# Patient Record
Sex: MALE | Race: WHITE | NOT HISPANIC OR LATINO | Employment: OTHER | URBAN - METROPOLITAN AREA
[De-identification: names, ages, dates, MRNs, and addresses within clinical notes are randomized per-mention and may not be internally consistent; named-entity substitution may affect disease eponyms.]

---

## 2017-10-09 LAB — HCV AB SER-ACNC: NEGATIVE

## 2020-01-21 ENCOUNTER — PATIENT OUTREACH (OUTPATIENT)
Dept: ADMINISTRATIVE | Facility: HOSPITAL | Age: 65
End: 2020-01-21

## 2020-02-21 ENCOUNTER — OFFICE VISIT (OUTPATIENT)
Dept: FAMILY MEDICINE | Facility: CLINIC | Age: 65
End: 2020-02-21
Payer: MEDICARE

## 2020-02-21 ENCOUNTER — HOSPITAL ENCOUNTER (OUTPATIENT)
Dept: RADIOLOGY | Facility: CLINIC | Age: 65
Discharge: HOME | End: 2020-02-21
Attending: NURSE PRACTITIONER
Payer: MEDICARE

## 2020-02-21 VITALS
BODY MASS INDEX: 32.14 KG/M2 | OXYGEN SATURATION: 94 % | WEIGHT: 212.06 LBS | HEIGHT: 68 IN | TEMPERATURE: 98 F | DIASTOLIC BLOOD PRESSURE: 68 MMHG | HEART RATE: 51 BPM | SYSTOLIC BLOOD PRESSURE: 160 MMHG

## 2020-02-21 DIAGNOSIS — R03.0 ELEVATED BLOOD PRESSURE READING IN OFFICE WITHOUT DIAGNOSIS OF HYPERTENSION: ICD-10-CM

## 2020-02-21 DIAGNOSIS — J44.9 CHRONIC OBSTRUCTIVE PULMONARY DISEASE, UNSPECIFIED COPD TYPE: ICD-10-CM

## 2020-02-21 DIAGNOSIS — E78.5 HYPERLIPIDEMIA, UNSPECIFIED HYPERLIPIDEMIA TYPE: ICD-10-CM

## 2020-02-21 DIAGNOSIS — Z12.5 SCREENING FOR MALIGNANT NEOPLASM OF PROSTATE: ICD-10-CM

## 2020-02-21 DIAGNOSIS — Z87.09 PERSONAL HISTORY OF ASBESTOSIS: ICD-10-CM

## 2020-02-21 DIAGNOSIS — R06.02 CHRONIC SHORTNESS OF BREATH: ICD-10-CM

## 2020-02-21 DIAGNOSIS — R79.81 BORDERLINE LOW OXYGEN SATURATION LEVEL: ICD-10-CM

## 2020-02-21 DIAGNOSIS — R06.2 DIFFUSE WHEEZING: ICD-10-CM

## 2020-02-21 DIAGNOSIS — Z99.81 ON HOME OXYGEN THERAPY: ICD-10-CM

## 2020-02-21 DIAGNOSIS — F17.210 HEAVY CIGARETTE SMOKER: ICD-10-CM

## 2020-02-21 DIAGNOSIS — Z87.09 PERSONAL HISTORY OF ASBESTOSIS: Primary | ICD-10-CM

## 2020-02-21 PROCEDURE — 99999 PR PBB SHADOW E&M-EST. PATIENT-LVL V: ICD-10-PCS | Mod: PBBFAC,,, | Performed by: NURSE PRACTITIONER

## 2020-02-21 PROCEDURE — 99204 OFFICE O/P NEW MOD 45 MIN: CPT | Mod: S$GLB,,, | Performed by: NURSE PRACTITIONER

## 2020-02-21 PROCEDURE — 71046 XR CHEST PA AND LATERAL: ICD-10-PCS | Mod: 26,,, | Performed by: RADIOLOGY

## 2020-02-21 PROCEDURE — 1101F PT FALLS ASSESS-DOCD LE1/YR: CPT | Mod: CPTII,S$GLB,, | Performed by: NURSE PRACTITIONER

## 2020-02-21 PROCEDURE — 71046 X-RAY EXAM CHEST 2 VIEWS: CPT | Mod: 26,,, | Performed by: RADIOLOGY

## 2020-02-21 PROCEDURE — 71046 X-RAY EXAM CHEST 2 VIEWS: CPT | Mod: TC,FY,PO

## 2020-02-21 PROCEDURE — 3008F BODY MASS INDEX DOCD: CPT | Mod: CPTII,S$GLB,, | Performed by: NURSE PRACTITIONER

## 2020-02-21 PROCEDURE — 1101F PR PT FALLS ASSESS DOC 0-1 FALLS W/OUT INJ PAST YR: ICD-10-PCS | Mod: CPTII,S$GLB,, | Performed by: NURSE PRACTITIONER

## 2020-02-21 PROCEDURE — 3008F PR BODY MASS INDEX (BMI) DOCUMENTED: ICD-10-PCS | Mod: CPTII,S$GLB,, | Performed by: NURSE PRACTITIONER

## 2020-02-21 PROCEDURE — 99204 PR OFFICE/OUTPT VISIT, NEW, LEVL IV, 45-59 MIN: ICD-10-PCS | Mod: S$GLB,,, | Performed by: NURSE PRACTITIONER

## 2020-02-21 PROCEDURE — 99999 PR PBB SHADOW E&M-EST. PATIENT-LVL V: CPT | Mod: PBBFAC,,, | Performed by: NURSE PRACTITIONER

## 2020-02-21 RX ORDER — ATORVASTATIN CALCIUM 20 MG/1
20 TABLET, FILM COATED ORAL DAILY
COMMUNITY
End: 2020-02-21 | Stop reason: SDUPTHER

## 2020-02-21 RX ORDER — FLUTICASONE FUROATE, UMECLIDINIUM BROMIDE AND VILANTEROL TRIFENATATE 100; 62.5; 25 UG/1; UG/1; UG/1
POWDER RESPIRATORY (INHALATION)
COMMUNITY
Start: 2020-01-24 | End: 2020-02-21 | Stop reason: SDUPTHER

## 2020-02-21 RX ORDER — TIZANIDINE 4 MG/1
TABLET ORAL
COMMUNITY
Start: 2019-12-27

## 2020-02-21 RX ORDER — ATORVASTATIN CALCIUM 20 MG/1
20 TABLET, FILM COATED ORAL DAILY
Qty: 90 TABLET | Refills: 1 | Status: SHIPPED | OUTPATIENT
Start: 2020-02-21 | End: 2020-05-20

## 2020-02-21 RX ORDER — SECUKINUMAB 150 MG/ML
INJECTION SUBCUTANEOUS
COMMUNITY
Start: 2020-02-11

## 2020-02-21 RX ORDER — GLYCOPYRROLATE 25 UG/ML
SOLUTION RESPIRATORY (INHALATION)
COMMUNITY
Start: 2020-02-18 | End: 2020-03-05

## 2020-02-21 RX ORDER — OXYCODONE AND ACETAMINOPHEN 10; 325 MG/1; MG/1
TABLET ORAL
COMMUNITY
Start: 2020-02-10

## 2020-02-21 RX ORDER — ALBUTEROL SULFATE 90 UG/1
AEROSOL, METERED RESPIRATORY (INHALATION)
COMMUNITY
Start: 2020-01-20 | End: 2020-02-21 | Stop reason: SDUPTHER

## 2020-02-21 RX ORDER — FLUTICASONE FUROATE, UMECLIDINIUM BROMIDE AND VILANTEROL TRIFENATATE 100; 62.5; 25 UG/1; UG/1; UG/1
1 POWDER RESPIRATORY (INHALATION) DAILY
Qty: 1 EACH | Refills: 3 | Status: SHIPPED | OUTPATIENT
Start: 2020-02-21 | End: 2020-06-29

## 2020-02-21 RX ORDER — TRAMADOL HYDROCHLORIDE 50 MG/1
TABLET ORAL
COMMUNITY
Start: 2020-01-22

## 2020-02-21 RX ORDER — IPRATROPIUM BROMIDE AND ALBUTEROL SULFATE 2.5; .5 MG/3ML; MG/3ML
3 SOLUTION RESPIRATORY (INHALATION) EVERY 6 HOURS PRN
COMMUNITY
End: 2020-03-05 | Stop reason: SDUPTHER

## 2020-02-21 RX ORDER — ALBUTEROL SULFATE 90 UG/1
2 AEROSOL, METERED RESPIRATORY (INHALATION) EVERY 4 HOURS PRN
Qty: 18 G | Refills: 3 | Status: SHIPPED | OUTPATIENT
Start: 2020-02-21 | End: 2020-02-24

## 2020-02-21 NOTE — PROGRESS NOTES
"Subjective:       Patient ID: Gerardo Solis is a 65 y.o. male.    Chief Complaint: Annual Exam  This is his first time being seen in the primary care clinic.   He is accompanied today by his daughter.  [facility] was seeing Dr. Cash as PCP and last saw him about on year ago and he is no longer taking [employer].  He is here to establish care.  Vitals:    02/21/20 1605   BP: (!) 160/68   Pulse:    Temp:      BP Readings from Last 3 Encounters:   02/21/20 (!) 160/68     Past Medical History:   Diagnosis Date    COPD (chronic obstructive pulmonary disease)     Hyperlipidemia      Review of Systems   Respiratory: Positive for cough and shortness of breath.        [facility] had a chest xray a few months ago ordered per  for asbestos case, states he had a "lung test with doctors at attorneys office today.  State many years go had CT (4-6 years ago)  [facility]  States he does have oxygen at home and nebulizer at home as well  He is currently under care of pain management  States he had labs two weeks ago 02/04/2020 per Dr. Weil at [facility]  He denies any history of HTN and states BLOOD PRESSURE moisés today due to testing at  office and the test he and daughter are describing sounds like possible PFT.  Objective:      Physical Exam   Constitutional: He is oriented to person, place, and time. He appears well-developed and well-nourished. He is active and cooperative.   HENT:   Head: Normocephalic and atraumatic.   Right Ear: Hearing, tympanic membrane, external ear and ear canal normal.   Left Ear: Hearing, tympanic membrane, external ear and ear canal normal.   Nose: Nose normal.   Mouth/Throat: Uvula is midline and oropharynx is clear and moist.   Eyes: Conjunctivae and lids are normal.   Neck: Trachea normal, normal range of motion, full passive range of motion without pain and phonation normal. Neck supple.   Cardiovascular: Normal rate and regular rhythm.   Apical at 62 " "per auscultation   Pulmonary/Chest: He has decreased breath sounds. He has wheezes.   Visibly short of breath and states "this is my normal" and daughter agrees. States he has home oxygen but is not wearing oxygen in visit.    Abdominal: Soft. There is no tenderness. There is no rigidity and no guarding.   Musculoskeletal: Normal range of motion.   Lymphadenopathy:        Head (right side): No submental, no submandibular, no tonsillar, no preauricular, no posterior auricular and no occipital adenopathy present.        Head (left side): No submental, no submandibular, no tonsillar, no preauricular, no posterior auricular and no occipital adenopathy present.     He has no cervical adenopathy.   Neurological: He is alert and oriented to person, place, and time.   Skin: Skin is warm, dry and intact.   Psychiatric: He has a normal mood and affect. His speech is normal and behavior is normal. Judgment and thought content normal. Cognition and memory are normal.   Nursing note and vitals reviewed.      Assessment & Plan:       Personal history of asbestosis  -     TRELEGY ELLIPTA 100-62.5-25 mcg DsDv; Inhale 1 puff into the lungs once daily.  Dispense: 1 each; Refill: 3  -     albuterol (PROVENTIL/VENTOLIN HFA) 90 mcg/actuation inhaler; Inhale 2 puffs into the lungs every 4 (four) hours as needed for Wheezing.  Dispense: 18 g; Refill: 3  -     Ambulatory referral/consult to Pulmonology; Future; Expected date: 02/28/2020  -     X-Ray Chest PA And Lateral; Future; Expected date: 02/21/2020    Chronic obstructive pulmonary disease, unspecified COPD type  -     TRELEGY ELLIPTA 100-62.5-25 mcg DsDv; Inhale 1 puff into the lungs once daily.  Dispense: 1 each; Refill: 3  -     albuterol (PROVENTIL/VENTOLIN HFA) 90 mcg/actuation inhaler; Inhale 2 puffs into the lungs every 4 (four) hours as needed for Wheezing.  Dispense: 18 g; Refill: 3  -     Ambulatory referral/consult to Pulmonology; Future; Expected date: 02/28/2020  -     " X-Ray Chest PA And Lateral; Future; Expected date: 02/21/2020    Heavy cigarette smoker  -     Ambulatory referral/consult to Smoking Cessation Program; Future; Expected date: 02/28/2020  -     X-Ray Chest PA And Lateral; Future; Expected date: 02/21/2020    Chronic shortness of breath  -     TRELEGY ELLIPTA 100-62.5-25 mcg DsDv; Inhale 1 puff into the lungs once daily.  Dispense: 1 each; Refill: 3  -     albuterol (PROVENTIL/VENTOLIN HFA) 90 mcg/actuation inhaler; Inhale 2 puffs into the lungs every 4 (four) hours as needed for Wheezing.  Dispense: 18 g; Refill: 3  -     Ambulatory referral/consult to Pulmonology; Future; Expected date: 02/28/2020  -     X-Ray Chest PA And Lateral; Future; Expected date: 02/21/2020  -     CBC auto differential; Future; Expected date: 02/21/2020  -     Comprehensive metabolic panel; Future; Expected date: 02/21/2020    Screening for malignant neoplasm of prostate  -     PSA, Screening; Future; Expected date: 02/21/2020    Hyperlipidemia, unspecified hyperlipidemia type  -     Lipid panel; Future; Expected date: 02/21/2020    Borderline low oxygen saturation level  -     CBC auto differential; Future; Expected date: 02/21/2020  -     Comprehensive metabolic panel; Future; Expected date: 02/21/2020    Elevated blood pressure reading in office without diagnosis of hypertension  -     CBC auto differential; Future; Expected date: 02/21/2020  -     Comprehensive metabolic panel; Future; Expected date: 02/21/2020    Diffuse wheezing    On home oxygen therapy    Encouraged to go home and remain on his oxygen as ordered and advised to take oxygen with him as she travels. I have also encouraged him to use his nebulizer treatment every 4-6  Hours.      Medication List with Changes/Refills   Current Medications    ALBUTEROL-IPRATROPIUM (DUO-NEB) 2.5 MG-0.5 MG/3 ML NEBULIZER SOLUTION    Take 3 mLs by nebulization every 6 (six) hours as needed. Rescue    COSENTYX PEN, 2 PENS, 150 MG/ML PNIJ         LONHALA MAGNAIR REFILL 25 MCG/ML NEBU    VVN BID    OXYCODONE-ACETAMINOPHEN (PERCOCET)  MG PER TABLET        TIZANIDINE (ZANAFLEX) 4 MG TABLET        TRAMADOL (ULTRAM) 50 MG TABLET       Changed and/or Refilled Medications    Modified Medication Previous Medication    ALBUTEROL (PROVENTIL/VENTOLIN HFA) 90 MCG/ACTUATION INHALER albuterol (PROVENTIL/VENTOLIN HFA) 90 mcg/actuation inhaler       Inhale 2 puffs into the lungs every 4 (four) hours as needed for Wheezing.        TRELEGY ELLIPTA 100-62.5-25 MCG DSDV TRELEGY ELLIPTA 100-62.5-25 mcg DsDv       Inhale 1 puff into the lungs once daily.         Follow up if symptoms worsen or fail to improve.

## 2020-02-21 NOTE — PATIENT INSTRUCTIONS
Please get all of your tests from your  and bring in to your next visit  How to Quit Smoking  Smoking is one of the hardest habits to break. About half of all people who have ever smoked have been able to quit. Most people who still smoke want to quit. Here are some of the best ways to stop smoking.    Keep trying  Most smokers make many attempts at quitting before they are successful. Its important not to give up.  Go cold turkey  Most former smokers quit cold turkey (all at once). Trying to cut back gradually doesn't seem to work as well, perhaps because it continues the smoking habit. Also, it is possible to inhale more while smoking fewer cigarettes. This results in the same amount of nicotine in your body.  Get support  Support programs can be a big help, especially for heavy smokers. These groups offer lectures, ways to change behavior, and peer support. Here are some ways to find a support program:  · Free national quitline: 800-QUIT-NOW (794-142-6250).  · Hospital quit-smoking programs.  · American Lung Association: (319.356.9807).  · American Cancer Society (181-393-0979).  Support at home is important too. Nonsmokers can offer praise and encouragement. If the smoker in your life finds it hard to quit, encourage them to keep trying.  Over-the-counter medicines  Nicotine replacement therapy may make quitting easier. Certain aids, such as the nicotine patch, gum, and lozenges, are available without a prescription. It is best to use these under a doctors care, though. The skin patch provides a steady supply of nicotine. Nicotine gum and lozenges give temporary bursts of low levels of nicotine. Both methods reduce the craving for cigarettes. Warning: If you have nausea, vomiting, dizziness, weakness, or a fast heartbeat, stop using these products and see your doctor.  Prescription medicines  After reviewing your smoking patterns and past attempts to quit, your doctor may offer a prescription  "medicine such as bupropion, varenicline, a nicotine inhaler, or nasal spray. Each has advantages and side effects. Your doctor can review these with you.  Health benefits of quitting  The benefits of quitting start right away and keep improving the longer you go without smoking. These benefits occur at any age.  So whether you are 17 or 70, quitting is a good decision. Some of the benefits include:  · 20 minutes: Blood pressure and pulse return to normal.  · 8 hours: Oxygen levels return to normal.  · 2 days: Ability to smell and taste begin to improve as damaged nerves regrow.  · 2 to 3 weeks: Circulation and lung function improve.  · 1 to 9 months: Coughing, congestion, and shortness of breath decrease; tiredness decreases.  · 1 year: Risk of heart attack decreases by half.  · 5 years: Risk of lung cancer decreases by half; risk of stroke becomes the same as a nonsmokers.  For more on how to quit smoking, try these online resources:   · Smokefree.gov  · "Clearing the Air" booklet from the National Cancer [employer]: smokefree.gov/sites/default/files/pdf/clearing-the-air-accessible.pdf  Date Last Reviewed: 3/1/2017  © 8998-8412 [employer]. [address] [place] [address], [place] [address]. All rights reserved. This information is not intended as a substitute for professional medical care. Always follow your healthcare professional's instructions.        "

## 2020-02-21 NOTE — PROGRESS NOTES
Subjective:       Patient ID: Gerardo Solis is a 65 y.o. male.    Chief Complaint: No chief complaint on file.    HPI  There were no vitals filed for this visit.  Review of Systems   Objective:      Physical Exam        Assessment & Plan:       There are no diagnoses linked to this encounter.      No follow-ups on file.

## 2020-02-24 RX ORDER — ALBUTEROL SULFATE 90 UG/1
AEROSOL, METERED RESPIRATORY (INHALATION)
Qty: 90 G | Refills: 3 | Status: SHIPPED | OUTPATIENT
Start: 2020-02-24 | End: 2021-05-13

## 2020-02-28 ENCOUNTER — PATIENT OUTREACH (OUTPATIENT)
Dept: ADMINISTRATIVE | Facility: HOSPITAL | Age: 65
End: 2020-02-28

## 2020-02-28 ENCOUNTER — CLINICAL SUPPORT (OUTPATIENT)
Dept: FAMILY MEDICINE | Facility: CLINIC | Age: 65
End: 2020-02-28
Payer: MEDICARE

## 2020-02-28 ENCOUNTER — TELEPHONE (OUTPATIENT)
Dept: FAMILY MEDICINE | Facility: CLINIC | Age: 65
End: 2020-02-28

## 2020-02-28 ENCOUNTER — LAB VISIT (OUTPATIENT)
Dept: LAB | Facility: HOSPITAL | Age: 65
End: 2020-02-28
Attending: NURSE PRACTITIONER
Payer: MEDICARE

## 2020-02-28 VITALS — SYSTOLIC BLOOD PRESSURE: 158 MMHG | HEART RATE: 66 BPM | DIASTOLIC BLOOD PRESSURE: 72 MMHG

## 2020-02-28 DIAGNOSIS — R06.02 CHRONIC SHORTNESS OF BREATH: ICD-10-CM

## 2020-02-28 DIAGNOSIS — E78.5 HYPERLIPIDEMIA, UNSPECIFIED HYPERLIPIDEMIA TYPE: ICD-10-CM

## 2020-02-28 DIAGNOSIS — Z12.5 SCREENING FOR MALIGNANT NEOPLASM OF PROSTATE: ICD-10-CM

## 2020-02-28 DIAGNOSIS — R03.0 ELEVATED BLOOD PRESSURE READING IN OFFICE WITHOUT DIAGNOSIS OF HYPERTENSION: ICD-10-CM

## 2020-02-28 DIAGNOSIS — R79.81 BORDERLINE LOW OXYGEN SATURATION LEVEL: ICD-10-CM

## 2020-02-28 DIAGNOSIS — I10 HYPERTENSION, UNSPECIFIED TYPE: Primary | ICD-10-CM

## 2020-02-28 LAB
ALBUMIN SERPL BCP-MCNC: 4.2 G/DL (ref 3.5–5.2)
ALP SERPL-CCNC: 85 U/L (ref 55–135)
ALT SERPL W/O P-5'-P-CCNC: 59 U/L (ref 10–44)
ANION GAP SERPL CALC-SCNC: 9 MMOL/L (ref 8–16)
AST SERPL-CCNC: 37 U/L (ref 10–40)
BASOPHILS # BLD AUTO: 0.1 K/UL (ref 0–0.2)
BASOPHILS NFR BLD: 0.8 % (ref 0–1.9)
BILIRUB SERPL-MCNC: 0.8 MG/DL (ref 0.1–1)
BUN SERPL-MCNC: 18 MG/DL (ref 8–23)
CALCIUM SERPL-MCNC: 9.7 MG/DL (ref 8.7–10.5)
CHLORIDE SERPL-SCNC: 103 MMOL/L (ref 95–110)
CHOLEST SERPL-MCNC: 144 MG/DL (ref 120–199)
CHOLEST/HDLC SERPL: 4.2 {RATIO} (ref 2–5)
CO2 SERPL-SCNC: 26 MMOL/L (ref 23–29)
COMPLEXED PSA SERPL-MCNC: 4.9 NG/ML (ref 0–4)
CREAT SERPL-MCNC: 1.2 MG/DL (ref 0.5–1.4)
DIFFERENTIAL METHOD: ABNORMAL
EOSINOPHIL # BLD AUTO: 0.3 K/UL (ref 0–0.5)
EOSINOPHIL NFR BLD: 2.9 % (ref 0–8)
ERYTHROCYTE [DISTWIDTH] IN BLOOD BY AUTOMATED COUNT: 13.2 % (ref 11.5–14.5)
EST. GFR  (AFRICAN AMERICAN): >60 ML/MIN/1.73 M^2
EST. GFR  (NON AFRICAN AMERICAN): >60 ML/MIN/1.73 M^2
GLUCOSE SERPL-MCNC: 89 MG/DL (ref 70–110)
HCT VFR BLD AUTO: 52.7 % (ref 40–54)
HDLC SERPL-MCNC: 34 MG/DL (ref 40–75)
HDLC SERPL: 23.6 % (ref 20–50)
HGB BLD-MCNC: 16.2 G/DL (ref 14–18)
IMM GRANULOCYTES # BLD AUTO: 0.05 K/UL (ref 0–0.04)
IMM GRANULOCYTES NFR BLD AUTO: 0.4 % (ref 0–0.5)
LDLC SERPL CALC-MCNC: 75.6 MG/DL (ref 63–159)
LYMPHOCYTES # BLD AUTO: 2.1 K/UL (ref 1–4.8)
LYMPHOCYTES NFR BLD: 17.8 % (ref 18–48)
MCH RBC QN AUTO: 29.7 PG (ref 27–31)
MCHC RBC AUTO-ENTMCNC: 30.7 G/DL (ref 32–36)
MCV RBC AUTO: 97 FL (ref 82–98)
MONOCYTES # BLD AUTO: 0.9 K/UL (ref 0.3–1)
MONOCYTES NFR BLD: 7.1 % (ref 4–15)
NEUTROPHILS # BLD AUTO: 8.5 K/UL (ref 1.8–7.7)
NEUTROPHILS NFR BLD: 71 % (ref 38–73)
NONHDLC SERPL-MCNC: 110 MG/DL
NRBC BLD-RTO: 0 /100 WBC
PLATELET # BLD AUTO: 307 K/UL (ref 150–350)
PMV BLD AUTO: 9.9 FL (ref 9.2–12.9)
POTASSIUM SERPL-SCNC: 4.9 MMOL/L (ref 3.5–5.1)
PROT SERPL-MCNC: 8.5 G/DL (ref 6–8.4)
RBC # BLD AUTO: 5.46 M/UL (ref 4.6–6.2)
SODIUM SERPL-SCNC: 138 MMOL/L (ref 136–145)
TRIGL SERPL-MCNC: 172 MG/DL (ref 30–150)
WBC # BLD AUTO: 11.92 K/UL (ref 3.9–12.7)

## 2020-02-28 PROCEDURE — 85025 COMPLETE CBC W/AUTO DIFF WBC: CPT

## 2020-02-28 PROCEDURE — 80053 COMPREHEN METABOLIC PANEL: CPT

## 2020-02-28 PROCEDURE — 80061 LIPID PANEL: CPT

## 2020-02-28 PROCEDURE — 99499 UNLISTED E&M SERVICE: CPT | Mod: S$GLB,,, | Performed by: NURSE PRACTITIONER

## 2020-02-28 PROCEDURE — 36415 COLL VENOUS BLD VENIPUNCTURE: CPT | Mod: PO

## 2020-02-28 PROCEDURE — 99499 NO LOS: ICD-10-PCS | Mod: S$GLB,,, | Performed by: NURSE PRACTITIONER

## 2020-02-28 PROCEDURE — 99999 PR PBB SHADOW E&M-EST. PATIENT-LVL II: ICD-10-PCS | Mod: PBBFAC,,,

## 2020-02-28 PROCEDURE — 84153 ASSAY OF PSA TOTAL: CPT

## 2020-02-28 PROCEDURE — 99999 PR PBB SHADOW E&M-EST. PATIENT-LVL II: CPT | Mod: PBBFAC,,,

## 2020-02-28 NOTE — TELEPHONE ENCOUNTER
Patient came in for a BP check this morning.  He reports that he has taken all medications as directed.  He has not been doing and BP checks at home. Patient was very fidgety and unable to sit still. Reports that he did have 3 cups of black coffee and has been smoking since he got up this AM.  Initial BP reading was 182/82 P 70 and after sitting quietly for 5 minutes his reading was 158/72 .  Patient was educated about caffiene intake and dangers of smoking. Patient verbalized understanding.

## 2020-02-28 NOTE — TELEPHONE ENCOUNTER
Marycruz, thanks and  please contact and be sure he keeps appt to follow with Dr. Dukes on 03/13. I noticed he changed his appt date from 03/06/2020.

## 2020-02-28 NOTE — PROGRESS NOTES
Patient came in for a BP check this morning.  He reports that he has taken all medications as directed.  He has not been doing and BP checks at home. Patient was very fidgety and unable to sit still. Reports that he did have 3 cups of black coffee and has been smoking since he got up this AM.  Initial BP reading was 182/82 P 70 and after sitting quietly for 5 minutes his reading was 158/72 .  Patient was educated about caffiene intake and dangers of smoking. Patient verbalized understanding.  A message has been sent to Lizbeth Brown DNP  to advise of today's findings.

## 2020-02-28 NOTE — PROGRESS NOTES
Chart review completed 02/28/2020.  Care Everywhere updates requested and reviewed.  Immunizations reconciled. Media reviewed.     Letter mailed.

## 2020-02-28 NOTE — LETTER
February 28, 2020    Gerardo Solis  [address]  [place] [address]     [facility]  [address]  [place] [address]  Phone: [phone] Dear Henry, [facility] is committed to your overall health.  To help you get the most out of each of your visits, we will review your information to make sure you are up to date on all of your recommended tests and/or procedures.      As a new patient to Dr. Dukes, we may not have your complete medical records.  After reviewing your chart have found that you may be due for the following:    Health Maintenance Due   Topic    Hepatitis C Screening     Lipid Panel     TETANUS VACCINE     Colonoscopy     Pneumococcal Vaccine (65+ Low/Medium Risk) (1 of 2 - PCV13)    Abdominal Aortic Aneurysm Screening        If you have had any of the above done at another facility, please bring the records or information with you so that your record at [facility] will be complete.      If you are currently taking medication, please bring it with you to your appointment for review.    Thank you for letting us care for you,    Tyler Swan, Care Coordinator  [facility]  Phone: [phone]  Fax: [phone]

## 2020-02-28 NOTE — TELEPHONE ENCOUNTER
----- Message from Regla Andrea sent at 2/28/2020 12:26 PM CST -----  Contact: self  Type: Needs Medical Advice    Who Called:  self  Symptoms (please be specific):    How long has patient had these symptoms:    Pharmacy name and phone #:    [id number] - [place] - [address]  [facility] [place] [address]  Phone: [phone] Fax: [phone]  Best Call Back Number: [phone] (home)   Additional Information: Patient states the pharmacy said patient's prescription of TRELEGY ELLIPTA 100-62.5-25 mcg DsDv needs a PA. Please antonio patient to advise. Thanks!

## 2020-03-02 ENCOUNTER — DOCUMENTATION ONLY (OUTPATIENT)
Dept: FAMILY MEDICINE | Facility: CLINIC | Age: 65
End: 2020-03-02

## 2020-03-02 ENCOUNTER — NURSE TRIAGE (OUTPATIENT)
Dept: ADMINISTRATIVE | Facility: CLINIC | Age: 65
End: 2020-03-02

## 2020-03-02 NOTE — PROGRESS NOTES
PA:    Tone Pandya 100-62.5-25    CMM key:   [id number]  Case ID:    [id number]  [employer]  ----------------------------  3/3/2020  Determination:   Denied    Have sent in an appeal and have tried to to get this expedited ... Spoke to Lurdes Huffman Shirley and then Wilian who then told me that the appeal is in review and hopefully will know something by tomorrow.    I have spoken to Wendy (patient's) who has complained that there is no reason why [employer] should not cover the medication. I tried explaining about formularies nad medications but she just kept stating we don't know what we are doing.  ----------------------------------  3/4/2020  Determination:  Appeal APPROVED  Valid 3/4/2020-12/31/2020

## 2020-03-02 NOTE — TELEPHONE ENCOUNTER
Pt calling to state he needs a prior authorization for medication    Reason for Disposition   Caller has NON-URGENT medication question about med that PCP prescribed and triager unable to answer question    Protocols used: MEDICATION QUESTION CALL-A-AH

## 2020-03-03 ENCOUNTER — TELEPHONE (OUTPATIENT)
Dept: FAMILY MEDICINE | Facility: CLINIC | Age: 65
End: 2020-03-03

## 2020-03-03 ENCOUNTER — PATIENT MESSAGE (OUTPATIENT)
Dept: FAMILY MEDICINE | Facility: CLINIC | Age: 65
End: 2020-03-03

## 2020-03-03 NOTE — TELEPHONE ENCOUNTER
Daughter is asking questions about her dads medication that needs a prior auth I have transferred her to [facility]

## 2020-03-03 NOTE — TELEPHONE ENCOUNTER
----- Message from Jaci Leyva sent at 3/3/2020  1:46 PM CST -----  Contact: pt's daughter Wendy [phone]  Call placed to pod. Patient's daughter Wendy called back she is very upset she states she called twice yesterday for  A refill on  His Inhaler.

## 2020-03-05 ENCOUNTER — TELEPHONE (OUTPATIENT)
Dept: PULMONOLOGY | Facility: CLINIC | Age: 65
End: 2020-03-05

## 2020-03-05 ENCOUNTER — OFFICE VISIT (OUTPATIENT)
Dept: PULMONOLOGY | Facility: CLINIC | Age: 65
End: 2020-03-05
Payer: MEDICARE

## 2020-03-05 VITALS
DIASTOLIC BLOOD PRESSURE: 67 MMHG | HEART RATE: 42 BPM | SYSTOLIC BLOOD PRESSURE: 132 MMHG | OXYGEN SATURATION: 94 % | WEIGHT: 211.44 LBS | BODY MASS INDEX: 32.15 KG/M2

## 2020-03-05 DIAGNOSIS — R06.02 CHRONIC SHORTNESS OF BREATH: ICD-10-CM

## 2020-03-05 DIAGNOSIS — Z87.09 PERSONAL HISTORY OF ASBESTOSIS: ICD-10-CM

## 2020-03-05 DIAGNOSIS — J44.9 CHRONIC OBSTRUCTIVE PULMONARY DISEASE, UNSPECIFIED COPD TYPE: ICD-10-CM

## 2020-03-05 PROCEDURE — 3075F PR MOST RECENT SYSTOLIC BLOOD PRESS GE 130-139MM HG: ICD-10-PCS | Mod: CPTII,S$GLB,, | Performed by: NURSE PRACTITIONER

## 2020-03-05 PROCEDURE — 1101F PT FALLS ASSESS-DOCD LE1/YR: CPT | Mod: CPTII,S$GLB,, | Performed by: NURSE PRACTITIONER

## 2020-03-05 PROCEDURE — 99215 PR OFFICE/OUTPT VISIT, EST, LEVL V, 40-54 MIN: ICD-10-PCS | Mod: S$GLB,,, | Performed by: NURSE PRACTITIONER

## 2020-03-05 PROCEDURE — 3008F PR BODY MASS INDEX (BMI) DOCUMENTED: ICD-10-PCS | Mod: CPTII,S$GLB,, | Performed by: NURSE PRACTITIONER

## 2020-03-05 PROCEDURE — 3078F PR MOST RECENT DIASTOLIC BLOOD PRESSURE < 80 MM HG: ICD-10-PCS | Mod: CPTII,S$GLB,, | Performed by: NURSE PRACTITIONER

## 2020-03-05 PROCEDURE — 3075F SYST BP GE 130 - 139MM HG: CPT | Mod: CPTII,S$GLB,, | Performed by: NURSE PRACTITIONER

## 2020-03-05 PROCEDURE — 1101F PR PT FALLS ASSESS DOC 0-1 FALLS W/OUT INJ PAST YR: ICD-10-PCS | Mod: CPTII,S$GLB,, | Performed by: NURSE PRACTITIONER

## 2020-03-05 PROCEDURE — 99999 PR PBB SHADOW E&M-EST. PATIENT-LVL III: ICD-10-PCS | Mod: PBBFAC,,, | Performed by: NURSE PRACTITIONER

## 2020-03-05 PROCEDURE — 99215 OFFICE O/P EST HI 40 MIN: CPT | Mod: S$GLB,,, | Performed by: NURSE PRACTITIONER

## 2020-03-05 PROCEDURE — 3078F DIAST BP <80 MM HG: CPT | Mod: CPTII,S$GLB,, | Performed by: NURSE PRACTITIONER

## 2020-03-05 PROCEDURE — 99999 PR PBB SHADOW E&M-EST. PATIENT-LVL III: CPT | Mod: PBBFAC,,, | Performed by: NURSE PRACTITIONER

## 2020-03-05 PROCEDURE — 3008F BODY MASS INDEX DOCD: CPT | Mod: CPTII,S$GLB,, | Performed by: NURSE PRACTITIONER

## 2020-03-05 RX ORDER — PREDNISONE 20 MG/1
TABLET ORAL
Qty: 12 TABLET | Refills: 1 | Status: SHIPPED | OUTPATIENT
Start: 2020-03-05 | End: 2020-05-25 | Stop reason: SDUPTHER

## 2020-03-05 RX ORDER — IPRATROPIUM BROMIDE AND ALBUTEROL SULFATE 2.5; .5 MG/3ML; MG/3ML
3 SOLUTION RESPIRATORY (INHALATION) EVERY 6 HOURS PRN
Qty: 120 VIAL | Refills: 11 | Status: SHIPPED | OUTPATIENT
Start: 2020-03-05 | End: 2021-05-31 | Stop reason: SDUPTHER

## 2020-03-05 NOTE — PATIENT INSTRUCTIONS
Go to [facility] and any other imaging center you have been to and ask for disc of images  Please drop off at Pulmonary clinic to be added to [facility] system    Ordering CT of chest and pulmonary function test to evaluate extent of COPD.  Six minute walk is ordered, if you qualify I will order portable oxygen.    Continue Trelegy 1 puff daily every day,  rinse mouth after using due to risk for thrush if mouth or tongue has white sores contact clinic    Recommend stop smoking, pamphlet [facility]    Prednisone when needed if albuterol does not control shortness of breath  Take one pill a day for three days, repeat for shortness of breath

## 2020-03-05 NOTE — PROGRESS NOTES
3/5/2020    Gerardo Solis  New Patient Consult    Chief Complaint   Patient presents with    Cough     constant     Shortness of Breath     constant       HPI: 3/5/2020 dx COPD, currently treated by Dr. Cash PCP, will request documents from office. has to change due to insurance coverage. Had CT of chest and PFT with  office for asbestosis exposure.   Currently Trelegy daily, Albuterol rescue 4x daily, using nebulizer 4x daily,   SOB- onset 10 years, gradually worsening with time, worse with exertion, improves with rest and albuterol inhaler together, limits activity but still able to care for self in home. 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; Worked at [employer] Asbestosis exposure, Smoking Hx: currently smoking 1/2 pack daily, 49 pack years  Family Hx: no Lung Cancer, no COPD, no Asthma  Medical Hx: no previous pneumonia ; no previous shoulder/chest surgery          The chief compliant  problem is new to me  PFSH:  Past Medical History:   Diagnosis Date    COPD (chronic obstructive pulmonary disease)     Hyperlipidemia          Past Surgical History:   Procedure Laterality Date    TONSILLECTOMY       Social History     Tobacco Use    Smoking status: Current Every Day Smoker     Packs/day: 0.50     Years: 18.00     Pack years: 9.00     Types: Cigarettes    Smokeless tobacco: Never Used   Substance Use Topics    Alcohol use: Yes     Alcohol/week: 3.0 standard drinks     Types: 1 Glasses of wine, 2 Cans of beer per week     Comment: monthly    Drug use: Never     Family History   Problem Relation Age of Onset    Diabetes Mother     Lung disease Father      Review of patient's allergies indicates:  No Known Allergies  I have reviewed past medical,  family, and social history. I have reviewed previous nurse notes.    Performance Status:The patient's activity level is housebound activities.          Review of Systems   Constitutional: Negative for activity change, appetite change, chills, diaphoresis, fatigue, fever and unexpected weight change.   HENT: Negative for dental problem, postnasal drip, rhinorrhea, sinus pressure, sinus pain, sneezing, sore throat, trouble swallowing and voice change.    Respiratory: Negative for apnea, chest tightness, and stridor.  Positive for cough,  shortness of breath, wheezing  Cardiovascular: Negative for chest pain, palpitations and leg swelling.   Gastrointestinal: Negative for abdominal distention, abdominal pain, constipation and nausea.   Musculoskeletal: Negative for gait problem, myalgias and neck pain.   Skin: Negative for color change and pallor.   Allergic/Immunologic: Negative for environmental allergies and food allergies.   Neurological: Negative for dizziness, speech difficulty, weakness, light-headedness, numbness and headaches.   Hematological: Negative for adenopathy. Does not bruise/bleed easily.   Psychiatric/Behavioral: Negative for dysphoric mood and sleep disturbance. The patient is not nervous/anxious.           Exam:Comprehensive exam done. /67 (BP Location: Right arm, Patient Position: Sitting)   Pulse (!) 42   Wt 95.9 kg (211 lb 6.7 oz)   SpO2 (!) 94% Comment: on room air at rest  BMI 32.15 kg/m²   Exam included Vitals as listed  Constitutional: He is oriented to person, place, and time. He appears well-developed. No distress.   Nose: Nose normal.   Mouth/Throat: Uvula is midline, oropharynx is clear and moist and mucous membranes are normal.  No oropharyngeal exudate, posterior oropharyngeal edema, posterior oropharyngeal erythema or tonsillar abscesses.  Mallapatti (M) score 2  Eyes: Pupils are equal, round, and reactive to light.   Neck: No JVD present. No thyromegaly present.    Cardiovascular: Normal rate, regular rhythm and normal heart sounds. Exam reveals no gallop and no friction rub.   No murmur heard.  Pulmonary/Chest: Effort normal and breath sounds abnormal: Bilateral rales and wheeze.   No accessory muscle usage or stridor. No apnea and no tachypnea. No respiratory distress, decreased breath sounds, rhonchi, or tenderness.   Abdominal: Soft. He exhibits no mass. There is no tenderness. No hepatosplenomegaly, hernias and normoactive bowel sounds  Musculoskeletal: Normal range of motion. exhibits no edema.   Lymphadenopathy:     He has no cervical adenopathy.     He has no axillary adenopathy.   Neurological:  alert and oriented to person, place, and time. not disoriented.   Skin: Skin is warm and dry. Capillary refill takes less 2 sec. No cyanosis or erythema. No pallor. Nails show no clubbing.   Psychiatric: normal mood and affect. behavior is normal. Judgment and thought content normal.       Radiographs (ct chest and cxr) reviewed: view by direct vision   X-Ray Chest PA And Lateral 02/21/2020   COPD with mild chronic interstitial change.         Labs reviewed       Lab Results   Component Value Date    WBC 11.92 02/28/2020    RBC 5.46 02/28/2020    HGB 16.2 02/28/2020    HCT 52.7 02/28/2020    MCV 97 02/28/2020    MCH 29.7 02/28/2020    MCHC 30.7 (L) 02/28/2020    RDW 13.2 02/28/2020     02/28/2020    MPV 9.9 02/28/2020    GRAN 8.5 (H) 02/28/2020    GRAN 71.0 02/28/2020    LYMPH 2.1 02/28/2020    LYMPH 17.8 (L) 02/28/2020    MONO 0.9 02/28/2020    MONO 7.1 02/28/2020    EOS 0.3 02/28/2020    BASO 0.10 02/28/2020    EOSINOPHIL 2.9 02/28/2020    BASOPHIL 0.8 02/28/2020     Results for POLY TANNER (MRN [id number]) as of 3/5/2020 08:35   Ref. Range 2/28/2020 11:18   CO2 Latest Ref Range: 23 - 29 mmol/L 26     PFT will be done and results to be reviewed  Pulmonary Functions Testing Results:        Plan:  Clinical impression is apparently straight forward and impression  with management as below.    Gerardo was seen today for cough and shortness of breath.    Diagnoses and all orders for this visit:    Personal history of asbestosis  -     Ambulatory referral/consult to Pulmonology  -     CT Chest Without Contrast; Future    Chronic obstructive pulmonary disease, unspecified COPD type  -     Ambulatory referral/consult to Pulmonology  -     CT Chest Without Contrast; Future  -     Complete PFT with bronchodilator; Future  -     Six Minute Walk Test to qualify for Home Oxygen; Future  -     albuterol-ipratropium (DUO-NEB) 2.5 mg-0.5 mg/3 mL nebulizer solution; Take 3 mLs by nebulization every 6 (six) hours as needed for Wheezing or Shortness of Breath. Rescue  -     mucus clearing device (ACAPELLA, FLUTTER); 1 Device by Misc.(Non-Drug; Combo Route) route 2 (two) times daily.    Chronic shortness of breath  -     Ambulatory referral/consult to Pulmonology  -     CT Chest Without Contrast; Future        Follow up in about 2 months (around 5/5/2020), or if symptoms worsen or fail to improve.    Discussed with patient above for education the following:      Patient Instructions   Go to [facility] and any other imaging center you have been to and ask for disc of images  Please drop off at Pulmonary clinic to be added to [facility] system    Ordering CT of chest and pulmonary function test to evaluate extent of COPD.  Six minute walk is ordered, if you qualify I will order portable oxygen.    Continue Trelegy 1 puff daily every day,  rinse mouth after using due to risk for thrush if mouth or tongue has white sores contact clinic    Recommend stop smoking, pamphlet given [facility]    Prednisone when needed if albuterol does not control shortness of breath  Take one pill a day for three days, repeat for shortness of breath

## 2020-03-05 NOTE — LETTER
March 5, 2020      Lizbeth Brown DNP, APRN  [address] [facility] [address]  [facility] [address]           [facility]  [address]  [place] [address]  Phone: [phone]  Fax: [phone]          Patient: Gerardo Solis   MR Number: [id number]   YOB: 1955   Date of Visit: 3/5/2020       Dear Lizbeth Brown:    Thank you for referring Gerardo Solis to me for evaluation. Attached you will find relevant portions of my assessment and plan of care.    If you have questions, please do not hesitate to call me. I look forward to following Gerardo Solis along with you.    Sincerely,    Melinda Alamo, NP    Enclosure  CC:  No Recipients    If you would like to receive this communication electronically, please contact [email or web address] or [phone] to request more information on [employer] access.    For providers and/or their staff who would like to refer a patient to [facility], please contact us through our one-stop-shop provider referral line, [facility] , at [phone].    If you feel you have received this communication in error or would no longer like to receive these types of communications, please e-mail [email or web address]

## 2020-03-05 NOTE — TELEPHONE ENCOUNTER
Spoke with patient and he will come in and sign a records release form.     ----- Message from Melinda Alamo NP sent at 3/5/2020 10:00 AM CST -----  Request copy of last note, Ct report, and PFT report from Dr. Cash in [place].   Ask for report from [facility].

## 2020-03-06 ENCOUNTER — TELEPHONE (OUTPATIENT)
Dept: PULMONOLOGY | Facility: CLINIC | Age: 65
End: 2020-03-06

## 2020-03-09 PROBLEM — R97.20 ELEVATED PSA: Status: ACTIVE | Noted: 2020-03-09

## 2020-03-09 PROBLEM — E78.2 MIXED HYPERLIPIDEMIA: Status: ACTIVE | Noted: 2020-03-09

## 2020-03-09 PROBLEM — R74.8 ELEVATED LIVER ENZYMES: Status: ACTIVE | Noted: 2020-03-09

## 2020-03-09 NOTE — PROGRESS NOTES
Discussed results over the phone with patient in detail. He is currently taking his Lipitor and advised continued low fat diet and to also watch concentrated sugars. With COPD and SOB cannot do much vigorous exercise. I do not have any other PSA levels to compare his current level which is slightly high at 4.9 and he is not sure of any prior levels. I have encouraged him to be sure to keep appt with Dr. Dukes to establish this week and he will continue follow up as needed with his PSA. Pt. Verbalized understanding and that he would be sure to keep appointment.

## 2020-03-13 ENCOUNTER — OFFICE VISIT (OUTPATIENT)
Dept: FAMILY MEDICINE | Facility: CLINIC | Age: 65
End: 2020-03-13
Payer: MEDICARE

## 2020-03-13 VITALS
SYSTOLIC BLOOD PRESSURE: 160 MMHG | HEIGHT: 68 IN | OXYGEN SATURATION: 94 % | DIASTOLIC BLOOD PRESSURE: 82 MMHG | BODY MASS INDEX: 32.38 KG/M2 | HEART RATE: 56 BPM | WEIGHT: 213.63 LBS | TEMPERATURE: 98 F

## 2020-03-13 DIAGNOSIS — Z23 NEED FOR VACCINATION FOR PNEUMOCOCCUS: ICD-10-CM

## 2020-03-13 DIAGNOSIS — Z86.010 HISTORY OF COLON POLYPS: ICD-10-CM

## 2020-03-13 DIAGNOSIS — L40.9 PSORIASIS: ICD-10-CM

## 2020-03-13 DIAGNOSIS — Z72.0 TOBACCO ABUSE: ICD-10-CM

## 2020-03-13 DIAGNOSIS — E78.2 MIXED HYPERLIPIDEMIA: ICD-10-CM

## 2020-03-13 DIAGNOSIS — M15.9 PRIMARY OSTEOARTHRITIS INVOLVING MULTIPLE JOINTS: ICD-10-CM

## 2020-03-13 DIAGNOSIS — I10 ESSENTIAL HYPERTENSION: Primary | ICD-10-CM

## 2020-03-13 DIAGNOSIS — Z11.59 NEED FOR HEPATITIS C SCREENING TEST: ICD-10-CM

## 2020-03-13 DIAGNOSIS — Z87.09 PERSONAL HISTORY OF ASBESTOSIS: ICD-10-CM

## 2020-03-13 DIAGNOSIS — J44.9 CHRONIC OBSTRUCTIVE PULMONARY DISEASE, UNSPECIFIED COPD TYPE: ICD-10-CM

## 2020-03-13 DIAGNOSIS — R74.8 ELEVATED LIVER ENZYMES: ICD-10-CM

## 2020-03-13 DIAGNOSIS — R97.20 ELEVATED PSA: ICD-10-CM

## 2020-03-13 PROCEDURE — 90670 PCV13 VACCINE IM: CPT | Mod: S$GLB,,, | Performed by: FAMILY MEDICINE

## 2020-03-13 PROCEDURE — 99999 PR PBB SHADOW E&M-EST. PATIENT-LVL IV: CPT | Mod: PBBFAC,,, | Performed by: FAMILY MEDICINE

## 2020-03-13 PROCEDURE — 3077F PR MOST RECENT SYSTOLIC BLOOD PRESSURE >= 140 MM HG: ICD-10-PCS | Mod: CPTII,S$GLB,, | Performed by: FAMILY MEDICINE

## 2020-03-13 PROCEDURE — 99214 OFFICE O/P EST MOD 30 MIN: CPT | Mod: 25,S$GLB,, | Performed by: FAMILY MEDICINE

## 2020-03-13 PROCEDURE — 1101F PR PT FALLS ASSESS DOC 0-1 FALLS W/OUT INJ PAST YR: ICD-10-PCS | Mod: CPTII,S$GLB,, | Performed by: FAMILY MEDICINE

## 2020-03-13 PROCEDURE — 3077F SYST BP >= 140 MM HG: CPT | Mod: CPTII,S$GLB,, | Performed by: FAMILY MEDICINE

## 2020-03-13 PROCEDURE — 99214 PR OFFICE/OUTPT VISIT, EST, LEVL IV, 30-39 MIN: ICD-10-PCS | Mod: 25,S$GLB,, | Performed by: FAMILY MEDICINE

## 2020-03-13 PROCEDURE — 99999 PR PBB SHADOW E&M-EST. PATIENT-LVL IV: ICD-10-PCS | Mod: PBBFAC,,, | Performed by: FAMILY MEDICINE

## 2020-03-13 PROCEDURE — 3079F DIAST BP 80-89 MM HG: CPT | Mod: CPTII,S$GLB,, | Performed by: FAMILY MEDICINE

## 2020-03-13 PROCEDURE — 1101F PT FALLS ASSESS-DOCD LE1/YR: CPT | Mod: CPTII,S$GLB,, | Performed by: FAMILY MEDICINE

## 2020-03-13 PROCEDURE — G0009 ADMIN PNEUMOCOCCAL VACCINE: HCPCS | Mod: S$GLB,,, | Performed by: FAMILY MEDICINE

## 2020-03-13 PROCEDURE — 90670 PNEUMOCOCCAL CONJUGATE VACCINE 13-VALENT LESS THAN 5YO & GREATER THAN: ICD-10-PCS | Mod: S$GLB,,, | Performed by: FAMILY MEDICINE

## 2020-03-13 PROCEDURE — 3079F PR MOST RECENT DIASTOLIC BLOOD PRESSURE 80-89 MM HG: ICD-10-PCS | Mod: CPTII,S$GLB,, | Performed by: FAMILY MEDICINE

## 2020-03-13 PROCEDURE — 3008F PR BODY MASS INDEX (BMI) DOCUMENTED: ICD-10-PCS | Mod: CPTII,S$GLB,, | Performed by: FAMILY MEDICINE

## 2020-03-13 PROCEDURE — G0009 PNEUMOCOCCAL CONJUGATE VACCINE 13-VALENT LESS THAN 5YO & GREATER THAN: ICD-10-PCS | Mod: S$GLB,,, | Performed by: FAMILY MEDICINE

## 2020-03-13 PROCEDURE — 3008F BODY MASS INDEX DOCD: CPT | Mod: CPTII,S$GLB,, | Performed by: FAMILY MEDICINE

## 2020-03-13 NOTE — PROGRESS NOTES
Subjective:       Patient ID: Gerardo Solis is a 65 y.o. male.    Chief Complaint: No chief complaint on file.    HPI   Patient presents to establish care with a new family doctor after his family doctor (Dr. Cash) stopped taking his insurance.  He tells me he is good now but tells me he has significant fears about the corona virus and would like to discuss these today.  He has a past medical history as in the problem list and below and tells me his chronic osteoarthritis pains are well controlled on his current pain medications prescribed by his pain management doctor (Dr. Whaley) and his psoriasis symptoms are well controlled by his dermatologist (Dr. Ball).  He follows with pulmonology within our system and tells me that his breathing has been crappy for years and notes that pulmonology believes he may need to be on oxygen all the time.  He is only wearing this at night or as needed when at home currently.  Chest CT and pulmonary function testing has been ordered but not completed yet.  He tells me his breathing is actually better than his baseline currently his he is on steroids but notes even with this he still gets easily short of breath with walking short distances and wheezes and has a chronic slightly productive cough all the time.  He had labs completed after his initial visit to establish here with nurse practitioner Kevin on 02/21/2020 which showed normal lipid values other than slightly elevated triglycerides and slightly low HDL which he tells me is his baseline.  His Lipitor was increased previously but he had muscle aches with this.  This resolved with reducing his dosing any has been taking the medication most days, I forget every once in awhile.  His PSA was elevated at 4.9 and he is unsure but tells me thinks he had an elevated PSA previously but is unsure.  He denies any urinary complaints and tells me he discussed this with nurse practitioner Kevin and has been on the fence  about whether he wants to see Urology or simply recheck his PSA in 6 months.  He tells me he may not make an appointment with Urology for a few months but would like referral to be seen.  His CMP was essentially normal with only a slightly elevated ALT at 59 and his CBC showed no significant abnormalities.  Even with his severe respiratory symptoms he tells me he is still smoking and is not ready to quit at this time.  Questioning him about health maintenance issues he tells me he has had a colonoscopy completed through [facility] but notes he is overdue for repeat colonoscopy with polyps found and 5 year follow-up recommended 6-7 years ago.  He is interested in referral to Gastroenterology for repeat colonoscopy but tells me he cannot schedule this until after he completes testing with pulmonology.  He has never had HIV or STD screening previously to his knowledge but tells me he has no interest in any of this.  He initially advised me he had not had a tetanus vaccine in over 10 years, had not had pneumonia vaccination previously and had never had Shingrix vaccination and was interested in getting up-to-date with vaccines today if possible.  Later he advised me he forgot but is sure he is up-to-date with all vaccinations through his dermatologist noting that he received all necessary vaccinations prior to being started on Cosentyx as this weakens his immune system.  He was recommended to get a flu shot this year and did receive this even though he was advised this may not be effective on Cosentyx.  He has never had hepatitis C antibody screening to his knowledge.  He believes he has had abdominal aortic aneurysm screening but is unsure of where or when and asked if we would [facility]  Questioning him about his elevated blood pressures they he tells me he has had hypertension in the past but has not needed to medication chronically for this and tells me his  "blood pressures are elevated secondary to caffeine use and smoking before office visits.    Review of Systems   Constitutional: Negative for activity change, appetite change, fatigue and unexpected weight change.   HENT: Negative for congestion, hearing loss, sore throat, trouble swallowing and voice change.    Eyes: Negative for visual disturbance.   Respiratory: Positive for cough, chest tightness, shortness of breath and wheezing.    Cardiovascular: Negative for chest pain, palpitations and leg swelling.   Gastrointestinal: Negative for abdominal pain, blood in stool, constipation, diarrhea, nausea and vomiting.   Genitourinary: Negative for decreased urine volume, difficulty urinating, dysuria, enuresis, flank pain, frequency, genital sores, hematuria and urgency.   Musculoskeletal: Negative for arthralgias, back pain, gait problem, joint swelling, myalgias, neck pain and neck stiffness.   Skin: Negative for rash and wound.   Neurological: Negative for dizziness, tremors, syncope, weakness, light-headedness, numbness and headaches.   Hematological: Negative for adenopathy. Does not bruise/bleed easily.   Psychiatric/Behavioral: Negative for dysphoric mood and sleep disturbance. The patient is not nervous/anxious.          Objective:      Vitals:    03/13/20 0941 03/13/20 0946 03/13/20 1026   BP: (!) 162/82 (!) 158/82 (!) 160/82   Pulse: (!) 56     Temp: 98.4 °F (36.9 °C)     SpO2: (!) 94%     Weight: 96.9 kg (213 lb 10 oz)     Height: 5' 8" (1.727 m)     PainSc: 0-No pain       Physical Exam   Constitutional: He is oriented to person, place, and time. He appears well-developed and well-nourished.  Non-toxic appearance. He does not have a sickly appearance. He does not appear ill. No distress.   Examination performed with him sitting in the exam room chair per patient preference.   HENT:   Head: Normocephalic and atraumatic.   Right Ear: External ear normal.   Left Ear: External ear normal.   Nose: Nose normal. "   Mouth/Throat: Oropharynx is clear and moist.   Eyes: Pupils are equal, round, and reactive to light. Conjunctivae and EOM are normal. No scleral icterus.   Neck: Trachea normal, normal range of motion and phonation normal. Neck supple. No thyroid mass and no thyromegaly present.   Cardiovascular: Normal rate, regular rhythm and normal heart sounds. Exam reveals no gallop and no friction rub.   No murmur heard.  Pulmonary/Chest: Effort normal. No respiratory distress. He has decreased breath sounds. He has wheezes. He has no rhonchi. He has no rales. Chest wall is not dull to percussion. He exhibits no tenderness.   He appears short breath and has audible wheezing with entering the room.  He appears more comfortable at rest, has no audible wheezing and has no difficulty speaking in complete sentences.  Lung sounds are decreased throughout and there are expiratory wheezes present throughout.     Abdominal: Soft. Bowel sounds are normal. He exhibits no distension and no mass. There is no tenderness. There is no guarding.   Musculoskeletal: Normal range of motion. He exhibits no edema or deformity.   Lymphadenopathy:     He has no cervical adenopathy.   Neurological: He is alert and oriented to person, place, and time.   Skin: Skin is warm and dry. He is not diaphoretic.   Psychiatric: He has a normal mood and affect. His behavior is normal. Judgment and thought content normal.   Nursing note and vitals reviewed.        Assessment:       1. Essential hypertension    2. Mixed hyperlipidemia    3. Elevated liver enzymes    4. Chronic obstructive pulmonary disease, unspecified COPD type    5. Personal history of asbestosis    6. Elevated PSA    7. Tobacco abuse    8. Need for hepatitis C screening test    9. Psoriasis    10. Primary osteoarthritis involving multiple joints    11. History of colon polyps    12. Need for vaccination for pneumococcus          Plan:   Essential hypertension  -     Microalbumin/creatinine  urine ratio; Future    Mixed hyperlipidemia    Elevated liver enzymes    Chronic obstructive pulmonary disease, unspecified COPD type    Personal history of asbestosis    Elevated PSA  -     Ambulatory referral/consult to Urology; Future; Expected date: 03/20/2020    Tobacco abuse    Need for hepatitis C screening test  -     Hepatitis C Antibody; Future; Expected date: 03/13/2020    Psoriasis    Primary osteoarthritis involving multiple joints    History of colon polyps  -     Ambulatory referral/consult to Gastroenterology; Future; Expected date: 03/20/2020    Need for vaccination for pneumococcus  -     Pneumococcal Conjugate Vaccine (13 Valent) (IM)      Follow up in about 2 weeks (around 3/27/2020).    Gerardo appears to be stable and at his baseline although he appears to have severe COPD and likely does need oxygen around the clock.  He will complete chest CT and pulmonary function testing as directed by pulmonology for this but reports currently he is better than his baseline.  He will continue to follow with pulmonology but I advised him to alert me if he has any problems and cannot be seen by pulmonology.  His psoriasis is well controlled with Cosentyx but he is immunocompromised secondary to this and he will be have to be very careful to avoid upper respiratory and other infections.  He is currently wearing a mask at all times when outdoors and he is very careful about hand washing and cleaning surfaces.  He will continue to follow with Dermatology and we will request records for his vaccinations but he tells me he is up-to-date with all of these.  I reviewed his labs with him as above and with his elevated PSA he was interested in referral to Urology for their opinion on this.  He advised me if he does not keep an appointment with them he would alert me to repeat PSA testing at 6 months as he was directed.  This is a reasonable option and I am fine with this.  I did recommend checking hepatitis C antibody  and microalbumin to creatinine ratio and reviewing this with him he was in agreement this but did not want to repeat thigh ABS today.  He can complete these at his convenience and will call him with results once obtained.  I did recommend prostate examination today but he had no interest in this.  Be able to get this completed by Gastroenterology when completing a follow-up colonoscopy and advised him to discuss this with Gastroenterology.  I placed referral as requested.  His osteoarthritis pains are well controlled with his current pain medication and he will continue to follow with pain management for these.  His blood pressure was significantly elevated today and did not normalize upon recheck.  It is also been elevated at 2 of his 3 other visits within our system and I discussed possible treatment options for hypertension including hydrochlorothiazide, Cozaar and Norvasc with risks and benefits.  He was very resistant to start on a medication for hypertension and wanted to only check his blood pressures at home and keep a log of these daily.  He was in agreement to return with his automated blood pressure cuff and blood pressure log in 2 weeks for nursing blood pressure recheck and blood pressure log was provided to him today.  If his blood pressures are not well controlled at home we will start him on medication at that time.  I discussed the chronic health risks of tobacco abuse and possible smoking cessation options and supports with him but he had no interest in these and advised me he may just try to cut back in the future.  I discussed slow reduction method with him and highly recommended he start with this by reducing by 1 cigarette daily each week until smoke-free.  He stated he would think about this.  He does need any medication refills at this time and discussing follow-up with him he wanted to be seen back at 6 month interval.  I recommended sooner follow-up but he was not interested in this unless  he has any problems.    Gerardo was seen for a 30 min appointment today and greater than half this time was spent counseling on the above.

## 2020-05-07 ENCOUNTER — TELEPHONE (OUTPATIENT)
Dept: UROLOGY | Facility: CLINIC | Age: 65
End: 2020-05-07

## 2020-05-19 DIAGNOSIS — E78.5 HYPERLIPIDEMIA, UNSPECIFIED HYPERLIPIDEMIA TYPE: ICD-10-CM

## 2020-05-20 RX ORDER — ATORVASTATIN CALCIUM 20 MG/1
TABLET, FILM COATED ORAL
Qty: 90 TABLET | Refills: 1 | Status: SHIPPED | OUTPATIENT
Start: 2020-05-20

## 2020-05-21 DIAGNOSIS — I10 ESSENTIAL HYPERTENSION: ICD-10-CM

## 2020-05-21 DIAGNOSIS — Z11.59 NEED FOR HEPATITIS C SCREENING TEST: Primary | ICD-10-CM

## 2020-05-21 DIAGNOSIS — E78.2 MIXED HYPERLIPIDEMIA: ICD-10-CM

## 2020-05-21 NOTE — TELEPHONE ENCOUNTER
It appears he canceled his 2 week nursing blood pressure recheck and is not scheduled to return until his 6 month follow-up.  Have him schedule a blood pressure recheck and labs and I have placed orders for his medication refill.

## 2020-05-21 NOTE — TELEPHONE ENCOUNTER
Scheduled pt for BP check on 5/29 at 10:30 AM. Pt verbalized he is still sceptical about coming out with Covid. No lab orders are in, will get pt scheduled for labs on same day as BP check. Can we please place labs needed?

## 2020-05-25 DIAGNOSIS — J44.9 CHRONIC OBSTRUCTIVE PULMONARY DISEASE, UNSPECIFIED COPD TYPE: ICD-10-CM

## 2020-05-25 RX ORDER — PREDNISONE 20 MG/1
TABLET ORAL
Qty: 12 TABLET | Refills: 0 | Status: SHIPPED | OUTPATIENT
Start: 2020-05-25 | End: 2020-10-20 | Stop reason: SDUPTHER

## 2020-05-29 ENCOUNTER — CLINICAL SUPPORT (OUTPATIENT)
Dept: FAMILY MEDICINE | Facility: CLINIC | Age: 65
End: 2020-05-29
Payer: MEDICARE

## 2020-05-29 VITALS — DIASTOLIC BLOOD PRESSURE: 80 MMHG | SYSTOLIC BLOOD PRESSURE: 140 MMHG | HEART RATE: 100 BPM

## 2020-05-29 PROCEDURE — 99999 PR PBB SHADOW E&M-EST. PATIENT-LVL I: CPT | Mod: PBBFAC,,,

## 2020-05-29 PROCEDURE — 99999 PR PBB SHADOW E&M-EST. PATIENT-LVL I: ICD-10-PCS | Mod: PBBFAC,,,

## 2020-06-04 ENCOUNTER — TELEPHONE (OUTPATIENT)
Dept: PULMONOLOGY | Facility: CLINIC | Age: 65
End: 2020-06-04

## 2020-06-04 NOTE — TELEPHONE ENCOUNTER
Spoke with patient on the phone. He wanted to move his appt a few days. He was afraid that the bad weather approaching would interfere with his appt on 6/9, Patient also wanted to move CT and pulmonary test up to a sooner appt so he could see if he qualified for portable oxygen. Patient also states that he needs a new nebulizer.----- Message from PRANEETH REILLY sent at 6/4/2020  8:47 AM CDT -----  Regarding: Please call  Contact: [phone]  Patient called because he needs to reschedule his appointment because of the expected bad weather coming. I noticed he also has aapointments scheduled on July 20 for CT and PFT/15 min walk. He said he was supposed to have those done before seeing her but he is scared he wont make it to the hospital without his oxygen. He said he does not have a portable oxygen tank and he is worried about if he has to evacuate. Please call him to discuss. Thank you!

## 2020-06-08 NOTE — PROGRESS NOTES
Gerardo Solis 65 y.o. male is here today for Blood Pressure check.   History of HTN yes.    Review of patient's allergies indicates:  No Known Allergies  Creatinine   Date Value Ref Range Status   02/28/2020 1.2 0.5 - 1.4 mg/dL Final     Sodium   Date Value Ref Range Status   02/28/2020 138 136 - 145 mmol/L Final     Potassium   Date Value Ref Range Status   02/28/2020 4.9 3.5 - 5.1 mmol/L Final   ]  Patient verifies taking blood pressure medications on a regular basis at the same time of the day.     Current Outpatient Medications:     albuterol (PROVENTIL/VENTOLIN HFA) 90 mcg/actuation inhaler, INHALE 2 PUFFS BY MOUTH INTO THE LUNGS EVERY 4 HOURS AS NEEDED FOR WHEEZING., Disp: 90 g, Rfl: 3    albuterol-ipratropium (DUO-NEB) 2.5 mg-0.5 mg/3 mL nebulizer solution, Take 3 mLs by nebulization every 6 (six) hours as needed for Wheezing or Shortness of Breath. Rescue, Disp: 120 vial, Rfl: 11    atorvastatin (LIPITOR) 20 MG tablet, TAKE 1 TABLET(20 MG) BY MOUTH EVERY DAY, Disp: 90 tablet, Rfl: 1    COSENTYX PEN, 2 PENS, 150 mg/mL PnIj, , Disp: , Rfl:     mucus clearing device (ACAPELLA, FLUTTER), 1 Device by Misc.(Non-Drug; Combo Route) route 2 (two) times daily. (Patient not taking: Reported on 3/13/2020), Disp: 1 Device, Rfl: 0    oxyCODONE-acetaminophen (PERCOCET)  mg per tablet, , Disp: , Rfl:     predniSONE (DELTASONE) 20 MG tablet, Take one pill a day for three days, repeat for shortness of breath, Disp: 12 tablet, Rfl: 0    tiZANidine (ZANAFLEX) 4 MG tablet, , Disp: , Rfl:     traMADol (ULTRAM) 50 mg tablet, , Disp: , Rfl:     TRELEGY ELLIPTA 100-62.5-25 mcg DsDv, Inhale 1 puff into the lungs once daily., Disp: 1 each, Rfl: 3  Does patient have record of home blood pressure readings no. Readings have been averaging N/a.   Last dose of blood pressure medication was taken at 730am.  Patient is asymptomatic.   Complains of N/A.    BP: (!) 140/80 , Pulse: 100 .

## 2020-06-11 ENCOUNTER — HOSPITAL ENCOUNTER (OUTPATIENT)
Dept: PULMONOLOGY | Facility: HOSPITAL | Age: 65
Discharge: HOME | End: 2020-06-11
Attending: NURSE PRACTITIONER
Payer: MEDICARE

## 2020-06-11 ENCOUNTER — TELEPHONE (OUTPATIENT)
Dept: PULMONOLOGY | Facility: CLINIC | Age: 65
End: 2020-06-11

## 2020-06-11 ENCOUNTER — HOSPITAL ENCOUNTER (OUTPATIENT)
Dept: RADIOLOGY | Facility: HOSPITAL | Age: 65
Discharge: HOME | End: 2020-06-11
Attending: NURSE PRACTITIONER
Payer: MEDICARE

## 2020-06-11 DIAGNOSIS — J44.9 CHRONIC OBSTRUCTIVE PULMONARY DISEASE, UNSPECIFIED COPD TYPE: ICD-10-CM

## 2020-06-11 DIAGNOSIS — Z87.09 PERSONAL HISTORY OF ASBESTOSIS: ICD-10-CM

## 2020-06-11 DIAGNOSIS — R06.02 CHRONIC SHORTNESS OF BREATH: ICD-10-CM

## 2020-06-11 LAB
BR6MWT: NORMAL
BRPFT: ABNORMAL
DLCO ADJ PRE: 14.27 ML/(MIN*MMHG) (ref 19.65–33.5)
DLCO SINGLE BREATH LLN: 19.65
DLCO SINGLE BREATH PRE REF: 53.7 %
DLCO SINGLE BREATH REF: 26.58
DLCOC SBVA LLN: 2.71
DLCOC SBVA PRE REF: 88 %
DLCOC SBVA REF: 3.94
DLCOC SINGLE BREATH LLN: 19.65
DLCOC SINGLE BREATH PRE REF: 53.7 %
DLCOC SINGLE BREATH REF: 26.58
DLCOVA LLN: 2.71
DLCOVA PRE REF: 88 %
DLCOVA PRE: 3.47 ML/(MIN*MMHG*L) (ref 2.71–5.17)
DLCOVA REF: 3.94
DLVAADJ PRE: 3.47 ML/(MIN*MMHG*L) (ref 2.71–5.17)
ERVN2 LLN: -16448.92
ERVN2 PRE REF: 51 %
ERVN2 PRE: 0.55 L (ref -16448.92–16451.08)
ERVN2 REF: 1.08
FEF 25 75 CHG: -30.2 %
FEF 25 75 LLN: 1.18
FEF 25 75 POST REF: 13.7 %
FEF 25 75 PRE REF: 19.7 %
FEF 25 75 REF: 2.56
FET100 CHG: 65.2 %
FEV1 CHG: 12.5 %
FEV1 FVC CHG: -27.9 %
FEV1 FVC LLN: 64
FEV1 FVC POST REF: 52 %
FEV1 FVC PRE REF: 72 %
FEV1 FVC REF: 77
FEV1 LLN: 2.35
FEV1 POST REF: 36.3 %
FEV1 PRE REF: 32.2 %
FEV1 REF: 3.18
FRCN2 LLN: 2.56
FRCN2 PRE REF: 82.2 %
FRCN2 REF: 3.54
FVC CHG: 55.9 %
FVC LLN: 3.12
FVC POST REF: 69.7 %
FVC PRE REF: 44.7 %
FVC REF: 4.14
IVC PRE: 2.31 L (ref 3.12–5.17)
IVC SINGLE BREATH LLN: 3.12
IVC SINGLE BREATH PRE REF: 55.7 %
IVC SINGLE BREATH REF: 4.14
MVV LLN: 104
MVV PRE REF: 39.2 %
MVV REF: 123
PEF CHG: -8.8 %
PEF LLN: 6.21
PEF POST REF: 48.5 %
PEF PRE REF: 53.2 %
PEF REF: 8.4
POST FEF 25 75: 0.35 L/S (ref 1.18–3.94)
POST FET 100: 11.66 SEC
POST FEV1 FVC: 40.01 % (ref 64.19–89.79)
POST FEV1: 1.16 L (ref 2.35–4.02)
POST FVC: 2.89 L (ref 3.12–5.17)
POST PEF: 4.08 L/S (ref 6.21–10.6)
PRE DLCO: 14.27 ML/(MIN*MMHG) (ref 19.65–33.5)
PRE FEF 25 75: 0.5 L/S (ref 1.18–3.94)
PRE FET 100: 7.06 SEC
PRE FEV1 FVC: 55.45 % (ref 64.19–89.79)
PRE FEV1: 1.03 L (ref 2.35–4.02)
PRE FRC N2: 2.91 L
PRE FVC: 1.85 L (ref 3.12–5.17)
PRE MVV: 48 L/MIN (ref 104.18–140.94)
PRE PEF: 4.47 L/S (ref 6.21–10.6)
RVN2 LLN: 1.79
RVN2 PRE REF: 93.2 %
RVN2 PRE: 2.3 L (ref 1.79–3.14)
RVN2 REF: 2.47
RVN2TLCN2 LLN: 30.33
RVN2TLCN2 PRE REF: 118.2 %
RVN2TLCN2 PRE: 46.46 % (ref 30.33–48.29)
RVN2TLCN2 REF: 39.31
TLCN2 LLN: 5.59
TLCN2 PRE REF: 73.4 %
TLCN2 PRE: 4.95 L (ref 5.59–7.89)
TLCN2 REF: 6.74
VA PRE: 4.12 L (ref 6.59–6.59)
VA SINGLE BREATH LLN: 6.59
VA SINGLE BREATH PRE REF: 62.4 %
VA SINGLE BREATH REF: 6.59
VCMAXN2 LLN: 3.12
VCMAXN2 PRE REF: 63.9 %
VCMAXN2 PRE: 2.65 L (ref 3.12–5.17)
VCMAXN2 REF: 4.14

## 2020-06-11 PROCEDURE — 71250 CT CHEST WITHOUT CONTRAST: ICD-10-PCS | Mod: 26,,, | Performed by: RADIOLOGY

## 2020-06-11 PROCEDURE — 94729 DIFFUSING CAPACITY: CPT

## 2020-06-11 PROCEDURE — 94618 PULMONARY STRESS TESTING: CPT

## 2020-06-11 PROCEDURE — 94727 PR PULM FUNCTION TEST BY GAS: ICD-10-PCS | Mod: 26,,, | Performed by: INTERNAL MEDICINE

## 2020-06-11 PROCEDURE — 71250 CT THORAX DX C-: CPT | Mod: 26,,, | Performed by: RADIOLOGY

## 2020-06-11 PROCEDURE — 94618 PULMONARY STRESS TESTING: ICD-10-PCS | Mod: 26,,, | Performed by: INTERNAL MEDICINE

## 2020-06-11 PROCEDURE — 94727 GAS DIL/WSHOT DETER LNG VOL: CPT

## 2020-06-11 PROCEDURE — 94060 EVALUATION OF WHEEZING: CPT

## 2020-06-11 PROCEDURE — 94729 PR C02/MEMBANE DIFFUSE CAPACITY: ICD-10-PCS | Mod: 26,,, | Performed by: INTERNAL MEDICINE

## 2020-06-11 PROCEDURE — 94727 GAS DIL/WSHOT DETER LNG VOL: CPT | Mod: 26,,, | Performed by: INTERNAL MEDICINE

## 2020-06-11 PROCEDURE — 94618 PULMONARY STRESS TESTING: CPT | Mod: 26,,, | Performed by: INTERNAL MEDICINE

## 2020-06-11 PROCEDURE — 94060 PR EVAL OF BRONCHOSPASM: ICD-10-PCS | Mod: 26,59,, | Performed by: INTERNAL MEDICINE

## 2020-06-11 PROCEDURE — 94729 DIFFUSING CAPACITY: CPT | Mod: 26,,, | Performed by: INTERNAL MEDICINE

## 2020-06-11 PROCEDURE — 71250 CT THORAX DX C-: CPT | Mod: TC

## 2020-06-11 PROCEDURE — 94060 EVALUATION OF WHEEZING: CPT | Mod: 26,59,, | Performed by: INTERNAL MEDICINE

## 2020-06-11 NOTE — TELEPHONE ENCOUNTER
[facility] for pt to call back for results----- Message from Melinda Alamo NP sent at 6/11/2020  4:21 PM CDT -----  CT shows emphysema and possible infection. Continue current treatment plan. Will review images and results at next appointment.

## 2020-06-11 NOTE — TELEPHONE ENCOUNTER
Patient notified------ Message from Melinda Alamo NP sent at 6/11/2020 11:44 AM CDT -----  Six minute walk went well, no desaturation.

## 2020-06-15 ENCOUNTER — OFFICE VISIT (OUTPATIENT)
Dept: PULMONOLOGY | Facility: CLINIC | Age: 65
End: 2020-06-15
Payer: MEDICARE

## 2020-06-15 ENCOUNTER — TELEPHONE (OUTPATIENT)
Dept: PULMONOLOGY | Facility: CLINIC | Age: 65
End: 2020-06-15

## 2020-06-15 VITALS
SYSTOLIC BLOOD PRESSURE: 150 MMHG | TEMPERATURE: 98 F | HEART RATE: 81 BPM | DIASTOLIC BLOOD PRESSURE: 83 MMHG | BODY MASS INDEX: 32.48 KG/M2 | OXYGEN SATURATION: 94 % | WEIGHT: 213.63 LBS

## 2020-06-15 DIAGNOSIS — R91.8 LUNG NODULES: ICD-10-CM

## 2020-06-15 DIAGNOSIS — F17.210 CIGARETTE NICOTINE DEPENDENCE WITHOUT COMPLICATION: ICD-10-CM

## 2020-06-15 DIAGNOSIS — J44.1 CHRONIC OBSTRUCTIVE PULMONARY DISEASE WITH ACUTE EXACERBATION: ICD-10-CM

## 2020-06-15 DIAGNOSIS — J44.9 CHRONIC OBSTRUCTIVE PULMONARY DISEASE, UNSPECIFIED COPD TYPE: Primary | ICD-10-CM

## 2020-06-15 PROCEDURE — 1101F PR PT FALLS ASSESS DOC 0-1 FALLS W/OUT INJ PAST YR: ICD-10-PCS | Mod: CPTII,S$GLB,, | Performed by: NURSE PRACTITIONER

## 2020-06-15 PROCEDURE — 3077F SYST BP >= 140 MM HG: CPT | Mod: CPTII,S$GLB,, | Performed by: NURSE PRACTITIONER

## 2020-06-15 PROCEDURE — 99214 OFFICE O/P EST MOD 30 MIN: CPT | Mod: S$GLB,,, | Performed by: NURSE PRACTITIONER

## 2020-06-15 PROCEDURE — 3008F PR BODY MASS INDEX (BMI) DOCUMENTED: ICD-10-PCS | Mod: CPTII,S$GLB,, | Performed by: NURSE PRACTITIONER

## 2020-06-15 PROCEDURE — 3079F PR MOST RECENT DIASTOLIC BLOOD PRESSURE 80-89 MM HG: ICD-10-PCS | Mod: CPTII,S$GLB,, | Performed by: NURSE PRACTITIONER

## 2020-06-15 PROCEDURE — 1101F PT FALLS ASSESS-DOCD LE1/YR: CPT | Mod: CPTII,S$GLB,, | Performed by: NURSE PRACTITIONER

## 2020-06-15 PROCEDURE — 99999 PR PBB SHADOW E&M-EST. PATIENT-LVL IV: ICD-10-PCS | Mod: PBBFAC,,, | Performed by: NURSE PRACTITIONER

## 2020-06-15 PROCEDURE — 3008F BODY MASS INDEX DOCD: CPT | Mod: CPTII,S$GLB,, | Performed by: NURSE PRACTITIONER

## 2020-06-15 PROCEDURE — 99999 PR PBB SHADOW E&M-EST. PATIENT-LVL IV: CPT | Mod: PBBFAC,,, | Performed by: NURSE PRACTITIONER

## 2020-06-15 PROCEDURE — 99214 PR OFFICE/OUTPT VISIT, EST, LEVL IV, 30-39 MIN: ICD-10-PCS | Mod: S$GLB,,, | Performed by: NURSE PRACTITIONER

## 2020-06-15 PROCEDURE — 3077F PR MOST RECENT SYSTOLIC BLOOD PRESSURE >= 140 MM HG: ICD-10-PCS | Mod: CPTII,S$GLB,, | Performed by: NURSE PRACTITIONER

## 2020-06-15 PROCEDURE — 3079F DIAST BP 80-89 MM HG: CPT | Mod: CPTII,S$GLB,, | Performed by: NURSE PRACTITIONER

## 2020-06-15 RX ORDER — PREDNISONE 20 MG/1
TABLET ORAL
Qty: 20 TABLET | Refills: 0 | Status: SHIPPED | OUTPATIENT
Start: 2020-06-15 | End: 2020-08-24 | Stop reason: SDUPTHER

## 2020-06-15 RX ORDER — VARENICLINE TARTRATE 1 MG/1
1 TABLET, FILM COATED ORAL 2 TIMES DAILY
Qty: 60 TABLET | Refills: 1 | Status: SHIPPED | OUTPATIENT
Start: 2020-07-15

## 2020-06-15 RX ORDER — PREDNISONE 5 MG/1
5 TABLET ORAL DAILY
Qty: 30 TABLET | Refills: 2 | Status: SHIPPED | OUTPATIENT
Start: 2020-06-15 | End: 2020-12-21

## 2020-06-15 RX ORDER — VARENICLINE TARTRATE 0.5 (11)-1
KIT ORAL
Qty: 1 PACKAGE | Refills: 0 | Status: SHIPPED | OUTPATIENT
Start: 2020-06-15

## 2020-06-15 RX ORDER — AMOXICILLIN AND CLAVULANATE POTASSIUM 875; 125 MG/1; MG/1
1 TABLET, FILM COATED ORAL 2 TIMES DAILY
Qty: 20 TABLET | Refills: 1 | Status: SHIPPED | OUTPATIENT
Start: 2020-06-15 | End: 2020-06-25

## 2020-06-15 RX ORDER — IBUPROFEN 200 MG
1 TABLET ORAL DAILY
Qty: 28 PATCH | Refills: 1 | Status: SHIPPED | OUTPATIENT
Start: 2020-06-15

## 2020-06-15 RX ORDER — PREDNISONE 10 MG/1
10 TABLET ORAL DAILY
Qty: 30 TABLET | Refills: 2 | Status: SHIPPED | OUTPATIENT
Start: 2020-06-15 | End: 2020-06-15 | Stop reason: ALTCHOICE

## 2020-06-15 NOTE — TELEPHONE ENCOUNTER
Returned phone call. No answer. LV----- Message from Jaci Leyva sent at 6/15/2020  3:28 PM CDT -----  Regarding: call back  Call placed to pod.  Patient's daughter Wendy called back and asked for a call back about his oxygen. They just left the office.    Call back [phone]

## 2020-06-15 NOTE — PATIENT INSTRUCTIONS
Bring sputum sample to any [facility] lab with in 4 hours of collecting. One sample in each cup on two different days.       Percussion therapy instruction  Do breathing treatments 2 x a day followed by 10 minutes of laying face down on cough or bed with 2-3 pillows under stomach. Make sure someone else is home in case you become dizzy.  Have someone beat on upper back or use hand held massager on back  Use acepella device attached to nebulizer      Start antibiotic Augmentin  Start Prednisone 10mg a day every day.     Go by [facility] and  any previous CTs, bring to pulmonary clinic to be loaded in system    Order for chantix and nicotine patches, if high dose patch is to strong call clinic and will order lower dose patch.

## 2020-06-15 NOTE — PROGRESS NOTES
6/15/2020    Gerardo Solis  New Patient Consult    Chief Complaint   Patient presents with    Follow-up     3 months    Shortness of Breath       HPI:   6/15/2020- in clinic with daughter, has medications to go in nebulizer machine but insurance will not cover new machine. Old machine is broken. Did six minute walk but did not desaturate. Took albuterol rescue 6 puffs before test. SOB- worsened in last 3 months, difficult to walk in house with out stopping to rest. Breathing labored. States not able to breath laying flat on back.   Cough- improved, during day, productive clear mucous, states felt better when on prednisone. No fever or chest tightness, currently on trelegy daily; currently smoking 15 cigarettes daily.  Wheeze- daily, day/night, worse when laying down.     3/5/2020 dx COPD, currently treated by Dr. Cash PCP, will request documents from office. has to change due to insurance coverage. Had CT of chest and PFT with  office for asbestosis exposure.   Currently Trelegy daily, Albuterol rescue 4x daily, using nebulizer 4x daily,   SOB- onset 10 years, gradually worsening with time, worse with exertion, improves with rest and albuterol inhaler together, limits activity but still able to care for self in home. 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; Worked at [employer] Asbestosis exposure, Smoking Hx: currently smoking 1/2 pack daily, 49 pack years  Family Hx: no Lung Cancer, no COPD, no Asthma  Medical Hx: no previous pneumonia ; no previous shoulder/chest surgery          The chief compliant  problem is worsening  PFSH:  Past Medical History:   Diagnosis Date    COPD (chronic obstructive pulmonary disease)     Essential hypertension 3/13/2020     Hyperlipidemia          Past Surgical History:   Procedure Laterality Date    TONSILLECTOMY       Social History     Tobacco Use    Smoking status: Current Every Day Smoker     Packs/day: 0.50     Years: 18.00     Pack years: 9.00     Types: Cigarettes    Smokeless tobacco: Never Used   Substance Use Topics    Alcohol use: Yes     Alcohol/week: 3.0 standard drinks     Types: 1 Glasses of wine, 2 Cans of beer per week     Comment: monthly    Drug use: Never     Family History   Problem Relation Age of Onset    Diabetes Mother     Lung disease Father      Review of patient's allergies indicates:  No Known Allergies  I have reviewed past medical, family, and social history. I have reviewed previous nurse notes.    Performance Status:The patient's activity level is housebound activities.          Review of Systems   Constitutional: Negative for activity change, appetite change, chills, diaphoresis, fatigue, fever and unexpected weight change.   HENT: Negative for dental problem, postnasal drip, rhinorrhea, sinus pressure, sinus pain, sneezing, sore throat, trouble swallowing and voice change.    Respiratory: Negative for apnea, chest tightness, and stridor.  Positive for cough,  shortness of breath, wheezing  Cardiovascular: Negative for chest pain, palpitations and leg swelling.   Gastrointestinal: Negative for abdominal distention, abdominal pain, constipation and nausea.   Musculoskeletal: Negative for gait problem, myalgias and neck pain.   Skin: Negative for color change and pallor.   Allergic/Immunologic: Negative for environmental allergies and food allergies.   Neurological: Negative for dizziness, speech difficulty, weakness, light-headedness, numbness and headaches.   Hematological: Negative for adenopathy. Does not bruise/bleed easily.   Psychiatric/Behavioral: Negative for dysphoric mood and sleep disturbance. The patient is not nervous/anxious.           Exam:Comprehensive exam done. BP (!) 150/83  (BP Location: Left arm, Patient Position: Sitting)   Pulse 81   Temp 97.9 °F (36.6 °C)   Wt 96.9 kg (213 lb 10 oz)   SpO2 (!) 94% Comment: on room air at rest  BMI 32.48 kg/m²   Exam included Vitals as listed  Constitutional: He is oriented to person, place, and time. He appears well-developed. No distress.   Nose: Nose normal.   Mouth/Throat: Uvula is midline, oropharynx is clear and moist and mucous membranes are normal.  No oropharyngeal exudate, posterior oropharyngeal edema, posterior oropharyngeal erythema or tonsillar abscesses.  Mallapatti (M) score 2  Eyes: Pupils are equal, round, and reactive to light.   Neck: No JVD present. No thyromegaly present.   Cardiovascular: Normal rate, regular rhythm and normal heart sounds. Exam reveals no gallop and no friction rub.   No murmur heard.  Pulmonary/Chest: Effort normal and breath sounds abnormal: Bilateral rales and wheeze.   No accessory muscle usage or stridor. No apnea and no tachypnea. No respiratory distress, decreased breath sounds, rhonchi, or tenderness.   Abdominal: Soft. He exhibits no mass. There is no tenderness. No hepatosplenomegaly, hernias and normoactive bowel sounds  Musculoskeletal: Normal range of motion. exhibits no edema.   Lymphadenopathy:     He has no cervical adenopathy.   Neurological:  alert and oriented to person, place, and time. not disoriented.   Skin: Skin is warm and dry. Capillary refill takes less 2 sec. No cyanosis or erythema. No pallor. Nails show moderate clubbing.   Psychiatric: normal mood and affect. behavior is normal. Judgment and thought content normal.       Radiographs (ct chest and cxr) reviewed: view by direct vision   CT CHEST WITHOUT CONTRAST  06/11/2020   Image degradation from motion.  Emphysematous change.  Mild bronchiectasis and peribronchial thickening in the lung bases and innumerable tiny nodular foci in the lung bases more so left.  This could be inflammatory/infectious and suggest correlation  clinically and consider 3 month follow-up CT in 3 months to see if this persists.     Other more scattered less than 5 mm nodules also seen for which short interval follow-up is suggested For multiple solid nodules all <6 mm, Fleischner Society with the scattered discrete nodules.  2017 guidelines recommend no routine follow up for a low risk patient, or follow up with non-contrast chest CT at 12 months after discovery in a high risk patient.        X-Ray Chest PA And Lateral 02/21/2020   COPD with mild chronic interstitial change.         Labs reviewed       Lab Results   Component Value Date    WBC 11.92 02/28/2020    RBC 5.46 02/28/2020    HGB 16.2 02/28/2020    HCT 52.7 02/28/2020    MCV 97 02/28/2020    MCH 29.7 02/28/2020    MCHC 30.7 (L) 02/28/2020    RDW 13.2 02/28/2020     02/28/2020    MPV 9.9 02/28/2020    GRAN 8.5 (H) 02/28/2020    GRAN 71.0 02/28/2020    LYMPH 2.1 02/28/2020    LYMPH 17.8 (L) 02/28/2020    MONO 0.9 02/28/2020    MONO 7.1 02/28/2020    EOS 0.3 02/28/2020    BASO 0.10 02/28/2020    EOSINOPHIL 2.9 02/28/2020    BASOPHIL 0.8 02/28/2020     Results for POLY TANNER (MRN [id number]) as of 3/5/2020 08:35   Ref. Range 2/28/2020 11:18   CO2 Latest Ref Range: 23 - 29 mmol/L 26     PFT  reviewed  Pulmonary Functions Testing Results:    Spirometry bronchodilator, lung volume by gas dilution, diffusion capacity measured June 11, 2020.  The FEV1 to FVC ratio was 55% indicating airflow obstruction.  The FEV1 measured 32% predicted at 1 L- this makes airflow obstruction severe.  There was    a 56% improvement in vital capacity and a 13% improvement in FEV1 following bronchodilator.  These responses are significant.  Total lung capacity was 73% predicted and a little low on lung volume by gas dilution.  Diffusion, uncorrected for anemia   present, was 54% predicted and also a little low.       Patient has severe airflow obstruction with bronchodilator response.  Also evidence for restriction.   Also evidence for loss of diffusion.  Clinical correlation recommended.     6 min walk study was performed June 11, 2020.  Baseline room air saturation was 92%.  With ambulation O2 sat did fall to 90% but no lower.  Patient walked about 67% reference distance of 340 m.       Plan:  Clinical impression is apparently straight forward and impression with management as below.    Gerardo was seen today for follow-up and shortness of breath.    Diagnoses and all orders for this visit:    Chronic obstructive pulmonary disease, unspecified COPD type  -     NEBULIZER FOR HOME USE  -     Six Minute Walk Test to qualify for Home Oxygen; Future  -     mucus clearing device (ACAPELLA, FLUTTER); 1 Device by Misc.(Non-Drug; Combo Route) route 2 (two) times daily.  -     Discontinue: predniSONE (DELTASONE) 10 MG tablet; Take 1 tablet (10 mg total) by mouth once daily.  -     predniSONE (DELTASONE) 5 MG tablet; Take 1 tablet (5 mg total) by mouth once daily.  -     predniSONE (DELTASONE) 20 MG tablet; Take one pill a day for three days, repeat for shortness of breath    Lung nodules  -     CT Chest Without Contrast; Future  -     AFB Culture & Smear; Standing  -     Culture, Respiratory with Gram Stain; Future    Chronic obstructive pulmonary disease with acute exacerbation  -     amoxicillin-clavulanate 875-125mg (AUGMENTIN) 875-125 mg per tablet; Take 1 tablet by mouth 2 (two) times daily. for 10 days  -     Discontinue: predniSONE (DELTASONE) 10 MG tablet; Take 1 tablet (10 mg total) by mouth once daily.  -     predniSONE (DELTASONE) 5 MG tablet; Take 1 tablet (5 mg total) by mouth once daily.  -     predniSONE (DELTASONE) 20 MG tablet; Take one pill a day for three days, repeat for shortness of breath    Cigarette nicotine dependence without complication  -     varenicline (CHANTIX STARTING MONTH BOX) 0.5 mg (11)- 1 mg (42) tablet; Take one 0.5mg tab by mouth once daily X3 days,then increase to one 0.5mg tab twice daily X4  days,then increase to one 1mg tab twice daily  -     varenicline (CHANTIX) 1 mg Tab; Take 1 tablet (1 mg total) by mouth 2 (two) times daily.  -     nicotine (NICODERM CQ) 21 mg/24 hr; Place 1 patch onto the skin once daily.        Follow up in about 3 months (around 9/15/2020), or if symptoms worsen or fail to improve.    Discussed with patient above for education the following:      Patient Instructions   Bring sputum sample to any [facility] lab with in 4 hours of collecting. One sample in each cup on two different days.       Percussion therapy instruction  Do breathing treatments 2 x a day followed by 10 minutes of laying face down on cough or bed with 2-3 pillows under stomach. Make sure someone else is home in case you become dizzy.  Have someone beat on upper back or use hand held massager on back  Use acepella device attached to nebulizer      Start antibiotic Augmentin  Start Prednisone 10mg a day every day.     Go by DIS and  any previous CTs, bring to pulmonary clinic to be loaded in system    Order for chantix and nicotine patches, if high dose patch is to strong call clinic and will order lower dose patch.

## 2020-06-22 ENCOUNTER — HOSPITAL ENCOUNTER (OUTPATIENT)
Dept: PULMONOLOGY | Facility: HOSPITAL | Age: 65
Discharge: HOME | End: 2020-06-22
Attending: NURSE PRACTITIONER
Payer: MEDICARE

## 2020-06-22 DIAGNOSIS — J44.9 CHRONIC OBSTRUCTIVE PULMONARY DISEASE, UNSPECIFIED COPD TYPE: ICD-10-CM

## 2020-06-22 PROCEDURE — 94618 PULMONARY STRESS TESTING: ICD-10-PCS | Mod: 26,,, | Performed by: INTERNAL MEDICINE

## 2020-06-22 PROCEDURE — 94618 PULMONARY STRESS TESTING: CPT

## 2020-06-22 PROCEDURE — 94618 PULMONARY STRESS TESTING: CPT | Mod: 26,,, | Performed by: INTERNAL MEDICINE

## 2020-06-23 LAB — BR6MWT: NORMAL

## 2020-06-30 ENCOUNTER — TELEPHONE (OUTPATIENT)
Dept: PULMONOLOGY | Facility: CLINIC | Age: 65
End: 2020-06-30

## 2020-06-30 DIAGNOSIS — J44.9 CHRONIC OBSTRUCTIVE PULMONARY DISEASE, UNSPECIFIED COPD TYPE: Primary | ICD-10-CM

## 2020-06-30 DIAGNOSIS — A49.8 PSEUDOMONAS AERUGINOSA INFECTION: ICD-10-CM

## 2020-06-30 RX ORDER — CIPROFLOXACIN 500 MG/1
500 TABLET ORAL EVERY 12 HOURS
Qty: 28 TABLET | Refills: 0 | Status: SHIPPED | OUTPATIENT
Start: 2020-06-30 | End: 2020-07-06

## 2020-06-30 NOTE — TELEPHONE ENCOUNTER
Patient notified. Stated he would go  his medication from the pharmacy.----- Message from Melinda Alamo NP sent at 6/30/2020  3:34 PM CDT -----  Sputum culture show particular bacteria that requires a stronger antibiotic. Medication has been sent to pharmacy.

## 2020-06-30 NOTE — TELEPHONE ENCOUNTER
Patient notified----- Message from Melinda Alamo NP sent at 6/30/2020  4:37 PM CDT -----    ----- Message -----  From: Melinda Alamo NP  Sent: 6/30/2020   3:38 PM CDT  To: Melinda Alamo NP    Inform Mr. Solis his lung infection is difficult to cure. He will have to take 2 weeks of antibiotics and will not be able to take his muscle relaxer Zanaflex while on the antibiotic. The mixture of the two medications can cause heart problems.

## 2020-07-06 ENCOUNTER — TELEPHONE (OUTPATIENT)
Dept: PULMONOLOGY | Facility: CLINIC | Age: 65
End: 2020-07-06

## 2020-07-06 DIAGNOSIS — J44.0 CHRONIC OBSTRUCTIVE PULMONARY DISEASE WITH ACUTE LOWER RESPIRATORY INFECTION: Primary | ICD-10-CM

## 2020-07-06 DIAGNOSIS — A49.8 PSEUDOMONAS AERUGINOSA INFECTION: ICD-10-CM

## 2020-07-06 DIAGNOSIS — J44.9 CHRONIC OBSTRUCTIVE PULMONARY DISEASE, UNSPECIFIED COPD TYPE: ICD-10-CM

## 2020-07-06 RX ORDER — LEVOFLOXACIN 750 MG/1
750 TABLET ORAL DAILY
Qty: 7 TABLET | Refills: 0 | Status: SHIPPED | OUTPATIENT
Start: 2020-07-06 | End: 2020-07-13

## 2020-07-06 RX ORDER — CIPROFLOXACIN 500 MG/1
500 TABLET ORAL EVERY 12 HOURS
Qty: 28 TABLET | Refills: 0 | OUTPATIENT
Start: 2020-07-06 | End: 2020-07-20

## 2020-07-06 NOTE — TELEPHONE ENCOUNTER
Insurance would not cover cipro and patient could not afford medication. Melinda sent new prescription for levaquin to pharmacy. Patient notified.

## 2020-08-24 DIAGNOSIS — J44.1 CHRONIC OBSTRUCTIVE PULMONARY DISEASE WITH ACUTE EXACERBATION: ICD-10-CM

## 2020-08-24 DIAGNOSIS — J44.9 CHRONIC OBSTRUCTIVE PULMONARY DISEASE, UNSPECIFIED COPD TYPE: ICD-10-CM

## 2020-08-24 NOTE — TELEPHONE ENCOUNTER
Spoke with patients daughter Wendy. Patient needing prescription for portable oxygen be sent to preferred DME company. Patient planning on buying POC out of pocket. Prescription sent to [facility].  ----- Message from Francesca Moise sent at 8/24/2020 12:50 PM CDT -----  Type:  Returning Call    Who Called:  Wendy Buckley (Daughter)  Who Left Message for Patient:  Manjit  Does the patient know what this is regarding?: oxygen order  Best Call Back Number:  [phone]  Additional Information: Needs order faxed to Suzie at [facility] at fax number [phone] RX needs to says portable O2 concentrator at 2 liters per minute/please call back to advise.

## 2020-08-25 RX ORDER — PREDNISONE 20 MG/1
TABLET ORAL
Qty: 20 TABLET | Refills: 0 | Status: SHIPPED | OUTPATIENT
Start: 2020-08-25 | End: 2021-01-04 | Stop reason: SDUPTHER

## 2020-08-25 NOTE — TELEPHONE ENCOUNTER
Leelee with [facility] services requesting office notes for patient. Office notes faxed to [phone]----- Message from Tameka Huddleston sent at 8/25/2020  1:38 PM CDT -----  Contact: leelee  Type: Needs Medical Advice    Who Called:leelee with [facility]    Best Call Back Number: [phone]  Additional Information: Requesting a call back regarding needing additional info on this pt   Please Advise ---Thank you

## 2020-08-31 ENCOUNTER — PATIENT OUTREACH (OUTPATIENT)
Dept: ADMINISTRATIVE | Facility: HOSPITAL | Age: 65
End: 2020-08-31

## 2020-08-31 NOTE — PROGRESS NOTES
Chart review completed 2020.  Care Everywhere updates requested and reviewed.  Immunizations reconciled. Media reports reviewed.  Duplicate HM overrides and  orders removed.  Overdue HM topic chart audit and/or requested.  Overdue lab testing linked to upcoming lab appointments if applies.    Lab jacqueline, and [facility] reviewed   Pap Smear and Lab testing   YES    HM updated with external HEP C report.   Requested COLONOSCOPY  records     Health Maintenance Due   Topic Date Due    HIV Screening  1970    Colorectal Cancer Screening  2005    Shingles Vaccine (2 of 3) 2016    Abdominal Aortic Aneurysm Screening  2020

## 2020-08-31 NOTE — LETTER
AUTHORIZATION FOR RELEASE OF   CONFIDENTIAL INFORMATION    Dear ANAHI VANN,    We are seeing Gerardo Solis, date of birth 1955, in the clinic at [facility]. Amando Dukes DO is the patient's PCP. Gerardo Solis has an outstanding lab/procedure at the time we reviewed his chart. In order to help keep his health information updated, he has authorized us to request the following medical record(s):        (  )  MAMMOGRAM                                      ( X )  COLONOSCOPY      (  )  PAP SMEAR                                          (  )  OUTSIDE LAB RESULTS     (  )  DEXA SCAN                                          (  )  EYE EXAM            (  )  FOOT EXAM                                          (  )  ENTIRE RECORD     (  )  OUTSIDE IMMUNIZATIONS                 (  )  _______________         Please fax records to [facility], Kenneth M Long, DO, [phone]    Jeanie Johnson LPN  Clinical Care Coordinator  [address] [address]  P: [phone]  F: [phone]            Patient Name: Gerardo Solis  : 1955  Patient Phone #: [phone]

## 2020-09-11 ENCOUNTER — PATIENT OUTREACH (OUTPATIENT)
Dept: ADMINISTRATIVE | Facility: HOSPITAL | Age: 65
End: 2020-09-11

## 2020-09-13 ENCOUNTER — PATIENT OUTREACH (OUTPATIENT)
Dept: ADMINISTRATIVE | Facility: OTHER | Age: 65
End: 2020-09-13

## 2020-09-13 NOTE — PROGRESS NOTES
Chart was reviewed for overdue [facility]active [facility] Encounters ([facility])  topics  Updates were requested from care everywhere  Health Maintenance has been updated  LINKS immunization registry triggered  Immunizations were reconciled

## 2020-09-14 PROBLEM — Z85.828 HISTORY OF BASAL CELL CARCINOMA OF SKIN: Status: ACTIVE | Noted: 2020-09-14

## 2020-09-14 PROBLEM — Z87.442 HISTORY OF KIDNEY STONES: Status: ACTIVE | Noted: 2020-09-14

## 2020-09-17 ENCOUNTER — TELEPHONE (OUTPATIENT)
Dept: PULMONOLOGY | Facility: CLINIC | Age: 65
End: 2020-09-17

## 2020-09-17 NOTE — TELEPHONE ENCOUNTER
Returned call to patient. No answer. LV----- Message from Elmira Mcdonald sent at 9/17/2020 11:01 AM CDT -----  Contact: self [phone]  Pt has rescheduled his appt but would like to speak to the nurse for another matter. Please call and advise thank you

## 2020-09-18 DIAGNOSIS — Z12.11 COLON CANCER SCREENING: ICD-10-CM

## 2020-09-23 DIAGNOSIS — R06.02 CHRONIC SHORTNESS OF BREATH: ICD-10-CM

## 2020-09-23 DIAGNOSIS — J44.9 CHRONIC OBSTRUCTIVE PULMONARY DISEASE, UNSPECIFIED COPD TYPE: ICD-10-CM

## 2020-09-23 DIAGNOSIS — Z87.09 PERSONAL HISTORY OF ASBESTOSIS: ICD-10-CM

## 2020-09-23 RX ORDER — FLUTICASONE FUROATE, UMECLIDINIUM BROMIDE AND VILANTEROL TRIFENATATE 100; 62.5; 25 UG/1; UG/1; UG/1
1 POWDER RESPIRATORY (INHALATION) DAILY
Qty: 60 EACH | Refills: 0 | Status: SHIPPED | OUTPATIENT
Start: 2020-09-23 | End: 2020-10-20 | Stop reason: SDUPTHER

## 2020-10-02 ENCOUNTER — OFFICE VISIT (OUTPATIENT)
Dept: PULMONOLOGY | Facility: CLINIC | Age: 65
End: 2020-10-02
Payer: MEDICARE

## 2020-10-02 VITALS
BODY MASS INDEX: 33.02 KG/M2 | SYSTOLIC BLOOD PRESSURE: 122 MMHG | DIASTOLIC BLOOD PRESSURE: 81 MMHG | HEART RATE: 101 BPM | OXYGEN SATURATION: 95 % | WEIGHT: 217.13 LBS

## 2020-10-02 DIAGNOSIS — Z72.0 TOBACCO ABUSE: ICD-10-CM

## 2020-10-02 DIAGNOSIS — J47.9 BRONCHIECTASIS WITHOUT COMPLICATION: Primary | ICD-10-CM

## 2020-10-02 DIAGNOSIS — J44.9 CHRONIC OBSTRUCTIVE PULMONARY DISEASE, UNSPECIFIED COPD TYPE: ICD-10-CM

## 2020-10-02 PROCEDURE — 99213 OFFICE O/P EST LOW 20 MIN: CPT | Mod: S$GLB,,, | Performed by: NURSE PRACTITIONER

## 2020-10-02 PROCEDURE — 99999 PR PBB SHADOW E&M-EST. PATIENT-LVL IV: CPT | Mod: PBBFAC,,, | Performed by: NURSE PRACTITIONER

## 2020-10-02 PROCEDURE — 3074F SYST BP LT 130 MM HG: CPT | Mod: CPTII,S$GLB,, | Performed by: NURSE PRACTITIONER

## 2020-10-02 PROCEDURE — 1101F PR PT FALLS ASSESS DOC 0-1 FALLS W/OUT INJ PAST YR: ICD-10-PCS | Mod: CPTII,S$GLB,, | Performed by: NURSE PRACTITIONER

## 2020-10-02 PROCEDURE — 3079F PR MOST RECENT DIASTOLIC BLOOD PRESSURE 80-89 MM HG: ICD-10-PCS | Mod: CPTII,S$GLB,, | Performed by: NURSE PRACTITIONER

## 2020-10-02 PROCEDURE — 3079F DIAST BP 80-89 MM HG: CPT | Mod: CPTII,S$GLB,, | Performed by: NURSE PRACTITIONER

## 2020-10-02 PROCEDURE — 3008F BODY MASS INDEX DOCD: CPT | Mod: CPTII,S$GLB,, | Performed by: NURSE PRACTITIONER

## 2020-10-02 PROCEDURE — 3008F PR BODY MASS INDEX (BMI) DOCUMENTED: ICD-10-PCS | Mod: CPTII,S$GLB,, | Performed by: NURSE PRACTITIONER

## 2020-10-02 PROCEDURE — 3074F PR MOST RECENT SYSTOLIC BLOOD PRESSURE < 130 MM HG: ICD-10-PCS | Mod: CPTII,S$GLB,, | Performed by: NURSE PRACTITIONER

## 2020-10-02 PROCEDURE — 99999 PR PBB SHADOW E&M-EST. PATIENT-LVL IV: ICD-10-PCS | Mod: PBBFAC,,, | Performed by: NURSE PRACTITIONER

## 2020-10-02 PROCEDURE — 1101F PT FALLS ASSESS-DOCD LE1/YR: CPT | Mod: CPTII,S$GLB,, | Performed by: NURSE PRACTITIONER

## 2020-10-02 PROCEDURE — 99213 PR OFFICE/OUTPT VISIT, EST, LEVL III, 20-29 MIN: ICD-10-PCS | Mod: S$GLB,,, | Performed by: NURSE PRACTITIONER

## 2020-10-02 NOTE — PROGRESS NOTES
10/2/2020    [facility]  Office Note    Chief Complaint   Patient presents with    Follow-up     still very short of breath    Bronchiectasis    COPD    Cough       HPI:   10/2/20- finished Levaquin for P. Aeruginosa in August states felt better. Did not get aerobika as ordered due to insurance coverage.   SOB- stable, constant complaint, wearing supplemental oxygen 90% of time at 2L NC. Has POC machine, worsens once monthly and improves prednisone 3 days.   Cough- daily, constant complaint, all day, no nocturnal arousals, productive clear/light yellow mucous. Associated with wheeze.  Currently smoking 6 cigarettes daily. Has been on chantix for 12 weeks, states patches did not work.         6/15/2020- in clinic with daughter, has medications to go in nebulizer machine but insurance will not cover new machine. Old machine is broken. Did six minute walk but did not desaturate. Took albuterol rescue 6 puffs before test. SOB- worsened in last 3 months, difficult to walk in house with out stopping to rest. Breathing labored. States not able to breath laying flat on back.   Cough- improved, during day, productive clear mucous, states felt better when on prednisone. No fever or chest tightness, currently on trelegy daily; currently smoking 15 cigarettes daily.  Wheeze- daily, day/night, worse when laying down.     3/5/2020 dx COPD, currently treated by Dr. Cash PCP, will request documents from office. has to change due to insurance coverage. Had CT of chest and PFT with  office for asbestosis exposure.   Currently Trelegy daily, Albuterol rescue 4x daily, using nebulizer 4x daily,   SOB- onset 10 years, gradually worsening with time, worse with exertion, improves with rest and albuterol inhaler together, limits activity but still able to care for self in home. Associated with wheeze  Cough- onset 10 years, nocturnal arousals 1x monthly, productive dime size clear white mucous, no chest tightness,      Social Hx: Retired, lives alone with pet cat, Worked as contractor, , ,  with asbestosis shingles 1970, [employer] 1972-74; Worked at [employer] Asbestosis exposure, Smoking Hx: currently smoking 1/2 pack daily, 49 pack years  Family Hx: no Lung Cancer, no COPD, no Asthma  Medical Hx: no previous pneumonia ; no previous shoulder/chest surgery      The chief compliant  problem is stable  PFSH:  Past Medical History:   Diagnosis Date    COPD (chronic obstructive pulmonary disease)     Essential hypertension 3/13/2020    Hyperlipidemia          Past Surgical History:   Procedure Laterality Date    TONSILLECTOMY       Social History     Tobacco Use    Smoking status: Current Every Day Smoker     Packs/day: 0.50     Years: 18.00     Pack years: 9.00     Types: Cigarettes    Smokeless tobacco: Never Used   Substance Use Topics    Alcohol use: Yes     Alcohol/week: 3.0 standard drinks     Types: 1 Glasses of wine, 2 Cans of beer per week     Comment: monthly    Drug use: Never     Family History   Problem Relation Age of Onset    Diabetes Mother     Lung disease Father      Review of patient's allergies indicates:  No Known Allergies  I have reviewed past medical, family, and social history. I have reviewed previous nurse notes.    Performance Status:The patient's activity level is housebound activities.          Review of Systems   Constitutional: Negative for activity change, appetite change, chills, diaphoresis, fatigue, fever and unexpected weight change.   HENT: Negative for dental problem, postnasal drip, rhinorrhea, sinus pressure, sinus pain, sneezing, sore throat, trouble swallowing and voice change.    Respiratory: Negative for apnea, chest tightness, and stridor.  Positive for cough,  shortness of breath, wheezing  Cardiovascular: Negative for chest pain, palpitations and leg swelling.   Gastrointestinal: Negative for abdominal distention, abdominal pain, constipation and  nausea.   Musculoskeletal: Negative for gait problem, myalgias and neck pain.   Skin: Negative for color change and pallor.   Allergic/Immunologic: Negative for environmental allergies and food allergies.   Neurological: Negative for dizziness, speech difficulty, weakness, light-headedness, numbness and headaches.   Hematological: Negative for adenopathy. Does not bruise/bleed easily.   Psychiatric/Behavioral: Negative for dysphoric mood and sleep disturbance. The patient is not nervous/anxious.           Exam:Comprehensive exam done. /81 (BP Location: Left arm, Patient Position: Sitting)   Pulse 101   Wt 98.5 kg (217 lb 2.5 oz)   SpO2 95% Comment: on room air at rest  BMI 33.02 kg/m²   Exam included Vitals as listed  Constitutional: He is oriented to person, place, and time. He appears well-developed. No distress.   Nose: Nose normal.   Mouth/Throat: Uvula is midline, oropharynx is clear and moist and mucous membranes are normal.  No oropharyngeal exudate, posterior oropharyngeal edema, posterior oropharyngeal erythema or tonsillar abscesses.  Mallapatti (M) score 2  Eyes: Pupils are equal, round, and reactive to light.   Neck: No JVD present. No thyromegaly present.   Cardiovascular: Normal rate, regular rhythm and normal heart sounds. Exam reveals no gallop and no friction rub.   No murmur heard.  Pulmonary/Chest: Effort normal and breath sounds abnormal: Bilateral rales and wheeze.   No accessory muscle usage or stridor. No apnea and no tachypnea. No respiratory distress, decreased breath sounds, rhonchi, or tenderness.   Abdominal: Soft. He exhibits no mass. There is no tenderness. No hepatosplenomegaly, hernias and normoactive bowel sounds  Musculoskeletal: Normal range of motion. exhibits no edema.   Lymphadenopathy:     He has no cervical adenopathy.   Neurological:  alert and oriented to person, place, and time. not disoriented.   Skin: Skin is warm and dry. Capillary refill takes less 2 sec. No  cyanosis or erythema. No pallor. Nails show moderate clubbing.   Psychiatric: normal mood and affect. behavior is normal. Judgment and thought content normal.       Radiographs (ct chest and cxr) reviewed: view by direct vision   CT CHEST WITHOUT CONTRAST  06/11/2020   Image degradation from motion.  Emphysematous change.  Mild bronchiectasis and peribronchial thickening in the lung bases and innumerable tiny nodular foci in the lung bases more so left.  This could be inflammatory/infectious and suggest correlation clinically and consider 3 month follow-up CT in 3 months to see if this persists.     Other more scattered less than 5 mm nodules also seen for which short interval follow-up is suggested For multiple solid nodules all <6 mm, Fleischner Society with the scattered discrete nodules.  2017 guidelines recommend no routine follow up for a low risk patient, or follow up with non-contrast chest CT at 12 months after discovery in a high risk patient.        X-Ray Chest PA And Lateral 02/21/2020   COPD with mild chronic interstitial change.         Labs reviewed       Lab Results   Component Value Date    WBC 11.92 02/28/2020    RBC 5.46 02/28/2020    HGB 16.2 02/28/2020    HCT 52.7 02/28/2020    MCV 97 02/28/2020    MCH 29.7 02/28/2020    MCHC 30.7 (L) 02/28/2020    RDW 13.2 02/28/2020     02/28/2020    MPV 9.9 02/28/2020    GRAN 8.5 (H) 02/28/2020    GRAN 71.0 02/28/2020    LYMPH 2.1 02/28/2020    LYMPH 17.8 (L) 02/28/2020    MONO 0.9 02/28/2020    MONO 7.1 02/28/2020    EOS 0.3 02/28/2020    BASO 0.10 02/28/2020    EOSINOPHIL 2.9 02/28/2020    BASOPHIL 0.8 02/28/2020     Results for POLY TANNER (MRN [id number]) as of 3/5/2020 08:35   Ref. Range 2/28/2020 11:18   CO2 Latest Ref Range: 23 - 29 mmol/L 26     PSEUDOMONAS AERUGINOSA 06/22/20   Culture, Respiratory with Gram Stain       PFT  reviewed  Pulmonary Functions Testing Results:    Spirometry bronchodilator, lung volume by gas dilution, diffusion  capacity measured June 11, 2020.  The FEV1 to FVC ratio was 55% indicating airflow obstruction.  The FEV1 measured 32% predicted at 1 L- this makes airflow obstruction severe.  There was    a 56% improvement in vital capacity and a 13% improvement in FEV1 following bronchodilator.  These responses are significant.  Total lung capacity was 73% predicted and a little low on lung volume by gas dilution.  Diffusion, uncorrected for anemia   present, was 54% predicted and also a little low.       Patient has severe airflow obstruction with bronchodilator response.  Also evidence for restriction.  Also evidence for loss of diffusion.  Clinical correlation recommended.     6 min walk study was performed June 11, 2020.  Baseline room air saturation was 92%.  With ambulation O2 sat did fall to 90% but no lower.  Patient walked about 67% reference distance of 340 m.       Plan:  Clinical impression is apparently straight forward and impression with management as below.    Gerardo was seen today for follow-up, bronchiectasis, copd and cough.    Diagnoses and all orders for this visit:    Bronchiectasis without complication    Chronic obstructive pulmonary disease, unspecified COPD type    Tobacco abuse        Follow up in about 3 months (around 1/2/2021), or if symptoms worsen or fail to improve.    Discussed with patient above for education the following:      Patient Instructions   Continue current COPD medication regiment    Percussion therapy instruction for bronchiectasis  Do breathing treatments 3 x a week with Aerobika mucous clearing device.   Have someone beat on upper back or use hand held massager on back    Use Aerobika every day for few minutes with out nebulizer attached.     Continue supplemental oxygen at 2 L    Continue smoking cessation, will have to wait few months before able to re order another month of Chantix. Try to cut down to 3 cig a day or none by next appointment.

## 2020-10-02 NOTE — PATIENT INSTRUCTIONS
Continue current COPD medication regiment    Percussion therapy instruction for bronchiectasis  Do breathing treatments 3 x a week with Aerobika mucous clearing device.   Have someone beat on upper back or use hand held massager on back    Use Aerobika every day for few minutes with out nebulizer attached.     Continue supplemental oxygen at 2 L    Continue smoking cessation, will have to wait few months before able to re order another month of Chantix. Try to cut down to 3 cig a day or none by next appointment.

## 2020-10-20 DIAGNOSIS — J44.9 CHRONIC OBSTRUCTIVE PULMONARY DISEASE, UNSPECIFIED COPD TYPE: ICD-10-CM

## 2020-10-20 DIAGNOSIS — R06.02 CHRONIC SHORTNESS OF BREATH: ICD-10-CM

## 2020-10-20 DIAGNOSIS — Z87.09 PERSONAL HISTORY OF ASBESTOSIS: ICD-10-CM

## 2020-10-20 RX ORDER — FLUTICASONE FUROATE, UMECLIDINIUM BROMIDE AND VILANTEROL TRIFENATATE 100; 62.5; 25 UG/1; UG/1; UG/1
1 POWDER RESPIRATORY (INHALATION) DAILY
Qty: 60 EACH | Refills: 11 | Status: SHIPPED | OUTPATIENT
Start: 2020-10-20 | End: 2021-09-27 | Stop reason: SDUPTHER

## 2020-10-20 NOTE — TELEPHONE ENCOUNTER
Refill request sent to provider.     ----- Message from Julissa Nguyen sent at 10/20/2020  1:15 PM CDT -----  Contact: pt at [phone]  Type:  RX Refill Request    Who Called:  pt  Refill or New Rx:  refill  RX Name and Strength:  predniSONE (DELTASONE) 20 MG tablet,TRELEGY ELLIPTA 100-62.5-25 mcg DsDv, and predniSONE (DELTASONE) 5 MG tablet  Is this a 30 day or 90 day RX:  30 day  Preferred Pharmacy with phone number:    [facility][id number] - [place] - [address] AT [facility] [address]  [facility] [place] [address]  Phone: [phone] Fax: [phone]  Local or Mail Order:  local  Ordering Provider:  Jasmeet Mejia Call Back Number:  [phone]

## 2020-10-21 RX ORDER — PREDNISONE 20 MG/1
TABLET ORAL
Qty: 12 TABLET | Refills: 0 | Status: SHIPPED | OUTPATIENT
Start: 2020-10-21 | End: 2020-12-21 | Stop reason: SDUPTHER

## 2020-10-26 ENCOUNTER — TELEPHONE (OUTPATIENT)
Dept: PULMONOLOGY | Facility: CLINIC | Age: 65
End: 2020-10-26

## 2020-10-26 NOTE — TELEPHONE ENCOUNTER
Attempted to call patient to let him know he needed an appt for refill of prednisone. No answer. Left voice message----- Message from Jemima Marte sent at 10/26/2020 10:01 AM CDT -----  Regarding: Refill Request  Type:  RX Refill Request    Who Called:  PT  Refill or New Rx:  refill  RX Name and Strength:  predniSONE (DELTASONE) 5 MG tablet  How is the patient currently taking it? (ex. 1XDay):  ...  Is this a 30 day or 90 day RX:  30 day   Preferred Pharmacy with phone number:    [facility] #[id number] - [place] - [address] AT [facility] [address]  [facility] [place] [address]  Phone: [phone] Fax: [phone]    Local or Mail Order:  Local  Ordering Provider:  Jasmeet Mejia Call Back Number:  [phone]

## 2020-10-30 ENCOUNTER — TELEPHONE (OUTPATIENT)
Dept: PULMONOLOGY | Facility: CLINIC | Age: 65
End: 2020-10-30

## 2020-10-30 NOTE — TELEPHONE ENCOUNTER
Spoke with patient and let him know that Melinda sent in a refill for prednisone on 10/21/20    ----- Message from Jessika Worley sent at 10/30/2020  9:59 AM CDT -----  Regarding: refill  Contact: POLY TANNER [[id number]]  Patient requesting a refill on prednisone 5mg.    [facility] #[id number] - [place] - [address] [facility] [address]  [facility] [place] [address]  Phone: [phone] Fax: [phone]    Please call patient at [phone].

## 2020-11-17 ENCOUNTER — TELEPHONE (OUTPATIENT)
Dept: PULMONOLOGY | Facility: CLINIC | Age: 65
End: 2020-11-17

## 2020-12-21 DIAGNOSIS — J44.9 CHRONIC OBSTRUCTIVE PULMONARY DISEASE, UNSPECIFIED COPD TYPE: ICD-10-CM

## 2020-12-21 RX ORDER — PREDNISONE 10 MG/1
10 TABLET ORAL DAILY
Qty: 30 TABLET | Refills: 0 | Status: SHIPPED | OUTPATIENT
Start: 2020-12-21 | End: 2021-01-04

## 2020-12-21 NOTE — TELEPHONE ENCOUNTER
Returned call to patients daughter to number provided. No answer. Left voice message. Forward request for prednisone to provider. ----- Message from Tameka Huddleston sent at 12/21/2020  2:28 PM CST -----  Contact: daughter  Type: Needs Medical Advice    Who Called:  pt daughter  Best Call Back Number: [phone]    Additional Information: Requesting a call back regarding pt medication. Pt is having a hard time breathing with out the predizone  Please Advise ---Thank you

## 2021-01-04 ENCOUNTER — OFFICE VISIT (OUTPATIENT)
Dept: PULMONOLOGY | Facility: CLINIC | Age: 66
End: 2021-01-04
Payer: MEDICARE

## 2021-01-04 VITALS
OXYGEN SATURATION: 96 % | SYSTOLIC BLOOD PRESSURE: 148 MMHG | HEIGHT: 68 IN | HEART RATE: 103 BPM | DIASTOLIC BLOOD PRESSURE: 82 MMHG | BODY MASS INDEX: 33.78 KG/M2 | WEIGHT: 222.88 LBS

## 2021-01-04 DIAGNOSIS — J44.9 CHRONIC OBSTRUCTIVE PULMONARY DISEASE, UNSPECIFIED COPD TYPE: ICD-10-CM

## 2021-01-04 DIAGNOSIS — J45.50 SEVERE PERSISTENT ASTHMA WITHOUT COMPLICATION: Primary | ICD-10-CM

## 2021-01-04 DIAGNOSIS — J44.89 COPD WITH ASTHMA: ICD-10-CM

## 2021-01-04 DIAGNOSIS — J45.909 STEROID-DEPENDENT ASTHMA, UNSPECIFIED ASTHMA SEVERITY, UNSPECIFIED WHETHER COMPLICATED, UNSPECIFIED WHETHER PERSISTENT: ICD-10-CM

## 2021-01-04 DIAGNOSIS — J44.1 CHRONIC OBSTRUCTIVE PULMONARY DISEASE WITH ACUTE EXACERBATION: ICD-10-CM

## 2021-01-04 PROCEDURE — 1159F PR MEDICATION LIST DOCUMENTED IN MEDICAL RECORD: ICD-10-PCS | Mod: S$GLB,,, | Performed by: NURSE PRACTITIONER

## 2021-01-04 PROCEDURE — 3008F BODY MASS INDEX DOCD: CPT | Mod: CPTII,S$GLB,, | Performed by: NURSE PRACTITIONER

## 2021-01-04 PROCEDURE — 99999 PR PBB SHADOW E&M-EST. PATIENT-LVL IV: CPT | Mod: PBBFAC,,, | Performed by: NURSE PRACTITIONER

## 2021-01-04 PROCEDURE — 99213 PR OFFICE/OUTPT VISIT, EST, LEVL III, 20-29 MIN: ICD-10-PCS | Mod: S$GLB,,, | Performed by: NURSE PRACTITIONER

## 2021-01-04 PROCEDURE — 3077F PR MOST RECENT SYSTOLIC BLOOD PRESSURE >= 140 MM HG: ICD-10-PCS | Mod: CPTII,S$GLB,, | Performed by: NURSE PRACTITIONER

## 2021-01-04 PROCEDURE — 1126F PR PAIN SEVERITY QUANTIFIED, NO PAIN PRESENT: ICD-10-PCS | Mod: S$GLB,,, | Performed by: NURSE PRACTITIONER

## 2021-01-04 PROCEDURE — 99999 PR PBB SHADOW E&M-EST. PATIENT-LVL IV: ICD-10-PCS | Mod: PBBFAC,,, | Performed by: NURSE PRACTITIONER

## 2021-01-04 PROCEDURE — 99213 OFFICE O/P EST LOW 20 MIN: CPT | Mod: S$GLB,,, | Performed by: NURSE PRACTITIONER

## 2021-01-04 PROCEDURE — 1101F PR PT FALLS ASSESS DOC 0-1 FALLS W/OUT INJ PAST YR: ICD-10-PCS | Mod: CPTII,S$GLB,, | Performed by: NURSE PRACTITIONER

## 2021-01-04 PROCEDURE — 3288F FALL RISK ASSESSMENT DOCD: CPT | Mod: CPTII,S$GLB,, | Performed by: NURSE PRACTITIONER

## 2021-01-04 PROCEDURE — 3077F SYST BP >= 140 MM HG: CPT | Mod: CPTII,S$GLB,, | Performed by: NURSE PRACTITIONER

## 2021-01-04 PROCEDURE — 3288F PR FALLS RISK ASSESSMENT DOCUMENTED: ICD-10-PCS | Mod: CPTII,S$GLB,, | Performed by: NURSE PRACTITIONER

## 2021-01-04 PROCEDURE — 3008F PR BODY MASS INDEX (BMI) DOCUMENTED: ICD-10-PCS | Mod: CPTII,S$GLB,, | Performed by: NURSE PRACTITIONER

## 2021-01-04 PROCEDURE — 1159F MED LIST DOCD IN RCRD: CPT | Mod: S$GLB,,, | Performed by: NURSE PRACTITIONER

## 2021-01-04 PROCEDURE — 1126F AMNT PAIN NOTED NONE PRSNT: CPT | Mod: S$GLB,,, | Performed by: NURSE PRACTITIONER

## 2021-01-04 PROCEDURE — 3079F PR MOST RECENT DIASTOLIC BLOOD PRESSURE 80-89 MM HG: ICD-10-PCS | Mod: CPTII,S$GLB,, | Performed by: NURSE PRACTITIONER

## 2021-01-04 PROCEDURE — 1101F PT FALLS ASSESS-DOCD LE1/YR: CPT | Mod: CPTII,S$GLB,, | Performed by: NURSE PRACTITIONER

## 2021-01-04 PROCEDURE — 3079F DIAST BP 80-89 MM HG: CPT | Mod: CPTII,S$GLB,, | Performed by: NURSE PRACTITIONER

## 2021-01-04 RX ORDER — PREDNISONE 5 MG/1
15 TABLET ORAL DAILY
Qty: 90 TABLET | Refills: 5 | Status: SHIPPED | OUTPATIENT
Start: 2021-01-04 | End: 2021-06-22 | Stop reason: SDUPTHER

## 2021-01-04 RX ORDER — PREDNISONE 20 MG/1
TABLET ORAL
Qty: 20 TABLET | Refills: 0 | Status: SHIPPED | OUTPATIENT
Start: 2021-01-04 | End: 2021-04-05 | Stop reason: SDUPTHER

## 2021-01-04 RX ORDER — ETANERCEPT 50 MG/ML
SOLUTION SUBCUTANEOUS
COMMUNITY
Start: 2020-12-15

## 2021-01-04 RX ORDER — GUSELKUMAB 100 MG/ML
INJECTION SUBCUTANEOUS
COMMUNITY
Start: 2020-10-12

## 2021-01-04 RX ORDER — RISANKIZUMAB-RZAA 75 MG/0.83
KIT SUBCUTANEOUS
COMMUNITY
Start: 2020-11-05

## 2021-01-05 ENCOUNTER — SPECIALTY PHARMACY (OUTPATIENT)
Dept: PHARMACY | Facility: CLINIC | Age: 66
End: 2021-01-05

## 2021-01-11 ENCOUNTER — LAB VISIT (OUTPATIENT)
Dept: LAB | Facility: HOSPITAL | Age: 66
End: 2021-01-11
Attending: NURSE PRACTITIONER
Payer: MEDICARE

## 2021-01-11 DIAGNOSIS — J45.50 SEVERE PERSISTENT ASTHMA WITHOUT COMPLICATION: ICD-10-CM

## 2021-01-11 PROCEDURE — 87070 CULTURE OTHR SPECIMN AEROBIC: CPT

## 2021-01-11 PROCEDURE — 87077 CULTURE AEROBIC IDENTIFY: CPT

## 2021-01-11 PROCEDURE — 87186 SC STD MICRODIL/AGAR DIL: CPT

## 2021-01-11 PROCEDURE — 87205 SMEAR GRAM STAIN: CPT

## 2021-01-14 ENCOUNTER — PATIENT MESSAGE (OUTPATIENT)
Dept: PHARMACY | Facility: CLINIC | Age: 66
End: 2021-01-14

## 2021-01-15 DIAGNOSIS — A49.8 PSEUDOMONAS AERUGINOSA INFECTION: ICD-10-CM

## 2021-01-15 DIAGNOSIS — J47.0 BRONCHIECTASIS WITH ACUTE LOWER RESPIRATORY INFECTION: Primary | ICD-10-CM

## 2021-01-15 LAB
BACTERIA SPEC AEROBE CULT: ABNORMAL
BACTERIA SPEC AEROBE CULT: ABNORMAL
GRAM STN SPEC: ABNORMAL

## 2021-01-15 RX ORDER — CIPROFLOXACIN 500 MG/1
500 TABLET ORAL 2 TIMES DAILY
Qty: 14 TABLET | Refills: 0 | Status: SHIPPED | OUTPATIENT
Start: 2021-01-15 | End: 2021-01-22

## 2021-01-20 ENCOUNTER — TELEPHONE (OUTPATIENT)
Dept: PULMONOLOGY | Facility: CLINIC | Age: 66
End: 2021-01-20

## 2021-01-25 ENCOUNTER — PATIENT MESSAGE (OUTPATIENT)
Dept: PHARMACY | Facility: CLINIC | Age: 66
End: 2021-01-25

## 2021-02-02 ENCOUNTER — TELEPHONE (OUTPATIENT)
Dept: PULMONOLOGY | Facility: CLINIC | Age: 66
End: 2021-02-02

## 2021-02-03 DIAGNOSIS — J45.50 SEVERE PERSISTENT ASTHMA WITHOUT COMPLICATION: Primary | ICD-10-CM

## 2021-02-03 RX ORDER — BENRALIZUMAB 30 MG/ML
30 INJECTION, SOLUTION SUBCUTANEOUS
Qty: 1 ML | Refills: 2 | Status: SHIPPED | OUTPATIENT
Start: 2021-02-03

## 2021-02-12 ENCOUNTER — PATIENT MESSAGE (OUTPATIENT)
Dept: PHARMACY | Facility: CLINIC | Age: 66
End: 2021-02-12

## 2021-02-24 ENCOUNTER — SPECIALTY PHARMACY (OUTPATIENT)
Dept: PHARMACY | Facility: CLINIC | Age: 66
End: 2021-02-24

## 2021-03-19 ENCOUNTER — SPECIALTY PHARMACY (OUTPATIENT)
Dept: PHARMACY | Facility: CLINIC | Age: 66
End: 2021-03-19

## 2021-04-05 ENCOUNTER — OFFICE VISIT (OUTPATIENT)
Dept: PULMONOLOGY | Facility: CLINIC | Age: 66
End: 2021-04-05
Payer: MEDICARE

## 2021-04-05 VITALS
DIASTOLIC BLOOD PRESSURE: 73 MMHG | BODY MASS INDEX: 31.98 KG/M2 | OXYGEN SATURATION: 94 % | WEIGHT: 211 LBS | SYSTOLIC BLOOD PRESSURE: 146 MMHG | HEIGHT: 68 IN | HEART RATE: 123 BPM

## 2021-04-05 DIAGNOSIS — J44.89 ASTHMA-COPD OVERLAP SYNDROME: Primary | ICD-10-CM

## 2021-04-05 PROCEDURE — 1101F PR PT FALLS ASSESS DOC 0-1 FALLS W/OUT INJ PAST YR: ICD-10-PCS | Mod: CPTII,S$GLB,, | Performed by: NURSE PRACTITIONER

## 2021-04-05 PROCEDURE — 3077F SYST BP >= 140 MM HG: CPT | Mod: CPTII,S$GLB,, | Performed by: NURSE PRACTITIONER

## 2021-04-05 PROCEDURE — 1126F AMNT PAIN NOTED NONE PRSNT: CPT | Mod: S$GLB,,, | Performed by: NURSE PRACTITIONER

## 2021-04-05 PROCEDURE — 3078F DIAST BP <80 MM HG: CPT | Mod: CPTII,S$GLB,, | Performed by: NURSE PRACTITIONER

## 2021-04-05 PROCEDURE — 3008F BODY MASS INDEX DOCD: CPT | Mod: CPTII,S$GLB,, | Performed by: NURSE PRACTITIONER

## 2021-04-05 PROCEDURE — 3078F PR MOST RECENT DIASTOLIC BLOOD PRESSURE < 80 MM HG: ICD-10-PCS | Mod: CPTII,S$GLB,, | Performed by: NURSE PRACTITIONER

## 2021-04-05 PROCEDURE — 3288F PR FALLS RISK ASSESSMENT DOCUMENTED: ICD-10-PCS | Mod: CPTII,S$GLB,, | Performed by: NURSE PRACTITIONER

## 2021-04-05 PROCEDURE — 1159F PR MEDICATION LIST DOCUMENTED IN MEDICAL RECORD: ICD-10-PCS | Mod: S$GLB,,, | Performed by: NURSE PRACTITIONER

## 2021-04-05 PROCEDURE — 3288F FALL RISK ASSESSMENT DOCD: CPT | Mod: CPTII,S$GLB,, | Performed by: NURSE PRACTITIONER

## 2021-04-05 PROCEDURE — 1126F PR PAIN SEVERITY QUANTIFIED, NO PAIN PRESENT: ICD-10-PCS | Mod: S$GLB,,, | Performed by: NURSE PRACTITIONER

## 2021-04-05 PROCEDURE — 99999 PR PBB SHADOW E&M-EST. PATIENT-LVL IV: CPT | Mod: PBBFAC,,, | Performed by: NURSE PRACTITIONER

## 2021-04-05 PROCEDURE — 1159F MED LIST DOCD IN RCRD: CPT | Mod: S$GLB,,, | Performed by: NURSE PRACTITIONER

## 2021-04-05 PROCEDURE — 99213 OFFICE O/P EST LOW 20 MIN: CPT | Mod: S$GLB,,, | Performed by: NURSE PRACTITIONER

## 2021-04-05 PROCEDURE — 1101F PT FALLS ASSESS-DOCD LE1/YR: CPT | Mod: CPTII,S$GLB,, | Performed by: NURSE PRACTITIONER

## 2021-04-05 PROCEDURE — 99213 PR OFFICE/OUTPT VISIT, EST, LEVL III, 20-29 MIN: ICD-10-PCS | Mod: S$GLB,,, | Performed by: NURSE PRACTITIONER

## 2021-04-05 PROCEDURE — 99999 PR PBB SHADOW E&M-EST. PATIENT-LVL IV: ICD-10-PCS | Mod: PBBFAC,,, | Performed by: NURSE PRACTITIONER

## 2021-04-05 PROCEDURE — 3077F PR MOST RECENT SYSTOLIC BLOOD PRESSURE >= 140 MM HG: ICD-10-PCS | Mod: CPTII,S$GLB,, | Performed by: NURSE PRACTITIONER

## 2021-04-05 PROCEDURE — 3008F PR BODY MASS INDEX (BMI) DOCUMENTED: ICD-10-PCS | Mod: CPTII,S$GLB,, | Performed by: NURSE PRACTITIONER

## 2021-04-05 RX ORDER — PREDNISONE 20 MG/1
TABLET ORAL
Qty: 20 TABLET | Refills: 0 | Status: SHIPPED | OUTPATIENT
Start: 2021-04-05 | End: 2021-06-22 | Stop reason: SDUPTHER

## 2021-04-16 ENCOUNTER — SPECIALTY PHARMACY (OUTPATIENT)
Dept: PHARMACY | Facility: CLINIC | Age: 66
End: 2021-04-16

## 2021-05-10 ENCOUNTER — PATIENT MESSAGE (OUTPATIENT)
Dept: RESEARCH | Facility: HOSPITAL | Age: 66
End: 2021-05-10

## 2021-05-20 ENCOUNTER — PATIENT MESSAGE (OUTPATIENT)
Dept: PHARMACY | Facility: CLINIC | Age: 66
End: 2021-05-20

## 2021-05-20 ENCOUNTER — SPECIALTY PHARMACY (OUTPATIENT)
Dept: PHARMACY | Facility: CLINIC | Age: 66
End: 2021-05-20

## 2021-05-31 DIAGNOSIS — J44.9 CHRONIC OBSTRUCTIVE PULMONARY DISEASE, UNSPECIFIED COPD TYPE: ICD-10-CM

## 2021-05-31 RX ORDER — IPRATROPIUM BROMIDE AND ALBUTEROL SULFATE 2.5; .5 MG/3ML; MG/3ML
3 SOLUTION RESPIRATORY (INHALATION) EVERY 6 HOURS PRN
Qty: 120 VIAL | Refills: 11 | Status: SHIPPED | OUTPATIENT
Start: 2021-05-31 | End: 2021-09-07 | Stop reason: SDUPTHER

## 2021-06-22 DIAGNOSIS — J45.909 STEROID-DEPENDENT ASTHMA, UNSPECIFIED ASTHMA SEVERITY, UNSPECIFIED WHETHER COMPLICATED, UNSPECIFIED WHETHER PERSISTENT: ICD-10-CM

## 2021-06-22 DIAGNOSIS — J44.89 COPD WITH ASTHMA: ICD-10-CM

## 2021-06-22 DIAGNOSIS — J44.89 ASTHMA-COPD OVERLAP SYNDROME: ICD-10-CM

## 2021-06-22 DIAGNOSIS — J45.50 SEVERE PERSISTENT ASTHMA WITHOUT COMPLICATION: ICD-10-CM

## 2021-06-22 RX ORDER — PREDNISONE 20 MG/1
TABLET ORAL
Qty: 20 TABLET | Refills: 1 | Status: SHIPPED | OUTPATIENT
Start: 2021-06-22 | End: 2021-09-27 | Stop reason: SDUPTHER

## 2021-06-22 RX ORDER — PREDNISONE 5 MG/1
15 TABLET ORAL DAILY
Qty: 90 TABLET | Refills: 5 | Status: SHIPPED | OUTPATIENT
Start: 2021-06-22 | End: 2021-12-22 | Stop reason: SDUPTHER

## 2021-07-12 ENCOUNTER — TELEPHONE (OUTPATIENT)
Dept: PULMONOLOGY | Facility: CLINIC | Age: 66
End: 2021-07-12

## 2021-07-14 ENCOUNTER — SPECIALTY PHARMACY (OUTPATIENT)
Dept: PHARMACY | Facility: CLINIC | Age: 66
End: 2021-07-14

## 2021-09-07 ENCOUNTER — PATIENT MESSAGE (OUTPATIENT)
Dept: PULMONOLOGY | Facility: CLINIC | Age: 66
End: 2021-09-07

## 2021-09-07 DIAGNOSIS — R06.02 CHRONIC SHORTNESS OF BREATH: ICD-10-CM

## 2021-09-07 DIAGNOSIS — J44.9 CHRONIC OBSTRUCTIVE PULMONARY DISEASE, UNSPECIFIED COPD TYPE: ICD-10-CM

## 2021-09-07 DIAGNOSIS — Z87.09 PERSONAL HISTORY OF ASBESTOSIS: ICD-10-CM

## 2021-09-07 RX ORDER — IPRATROPIUM BROMIDE AND ALBUTEROL SULFATE 2.5; .5 MG/3ML; MG/3ML
3 SOLUTION RESPIRATORY (INHALATION) EVERY 6 HOURS PRN
Qty: 120 VIAL | Refills: 11 | Status: SHIPPED | OUTPATIENT
Start: 2021-09-07

## 2021-09-07 RX ORDER — ALBUTEROL SULFATE 90 UG/1
AEROSOL, METERED RESPIRATORY (INHALATION)
Qty: 90 G | Refills: 3 | Status: SHIPPED | OUTPATIENT
Start: 2021-09-07 | End: 2022-02-18 | Stop reason: SDUPTHER

## 2021-09-10 ENCOUNTER — SPECIALTY PHARMACY (OUTPATIENT)
Dept: PHARMACY | Facility: CLINIC | Age: 66
End: 2021-09-10

## 2021-09-27 ENCOUNTER — OFFICE VISIT (OUTPATIENT)
Dept: PULMONOLOGY | Facility: CLINIC | Age: 66
End: 2021-09-27
Payer: MEDICARE

## 2021-09-27 VITALS
TEMPERATURE: 98 F | WEIGHT: 214.75 LBS | BODY MASS INDEX: 32.55 KG/M2 | DIASTOLIC BLOOD PRESSURE: 80 MMHG | SYSTOLIC BLOOD PRESSURE: 198 MMHG | HEIGHT: 68 IN | HEART RATE: 120 BPM | OXYGEN SATURATION: 95 % | RESPIRATION RATE: 20 BRPM

## 2021-09-27 DIAGNOSIS — J96.11 CHRONIC RESPIRATORY FAILURE WITH HYPOXIA: ICD-10-CM

## 2021-09-27 DIAGNOSIS — J44.89 ASTHMA-COPD OVERLAP SYNDROME: ICD-10-CM

## 2021-09-27 DIAGNOSIS — R06.02 CHRONIC SHORTNESS OF BREATH: ICD-10-CM

## 2021-09-27 DIAGNOSIS — J47.9 BRONCHIECTASIS WITHOUT COMPLICATION: ICD-10-CM

## 2021-09-27 DIAGNOSIS — J44.9 CHRONIC OBSTRUCTIVE PULMONARY DISEASE, UNSPECIFIED COPD TYPE: Primary | ICD-10-CM

## 2021-09-27 DIAGNOSIS — G47.30 SLEEP APNEA, UNSPECIFIED TYPE: ICD-10-CM

## 2021-09-27 DIAGNOSIS — Z87.09 PERSONAL HISTORY OF ASBESTOSIS: ICD-10-CM

## 2021-09-27 PROCEDURE — 3077F SYST BP >= 140 MM HG: CPT | Mod: CPTII,S$GLB,, | Performed by: NURSE PRACTITIONER

## 2021-09-27 PROCEDURE — 99214 PR OFFICE/OUTPT VISIT, EST, LEVL IV, 30-39 MIN: ICD-10-PCS | Mod: S$GLB,,, | Performed by: NURSE PRACTITIONER

## 2021-09-27 PROCEDURE — 3008F PR BODY MASS INDEX (BMI) DOCUMENTED: ICD-10-PCS | Mod: CPTII,S$GLB,, | Performed by: NURSE PRACTITIONER

## 2021-09-27 PROCEDURE — 3077F PR MOST RECENT SYSTOLIC BLOOD PRESSURE >= 140 MM HG: ICD-10-PCS | Mod: CPTII,S$GLB,, | Performed by: NURSE PRACTITIONER

## 2021-09-27 PROCEDURE — 3288F FALL RISK ASSESSMENT DOCD: CPT | Mod: CPTII,S$GLB,, | Performed by: NURSE PRACTITIONER

## 2021-09-27 PROCEDURE — 99214 OFFICE O/P EST MOD 30 MIN: CPT | Mod: S$GLB,,, | Performed by: NURSE PRACTITIONER

## 2021-09-27 PROCEDURE — 1159F MED LIST DOCD IN RCRD: CPT | Mod: CPTII,S$GLB,, | Performed by: NURSE PRACTITIONER

## 2021-09-27 PROCEDURE — 1101F PT FALLS ASSESS-DOCD LE1/YR: CPT | Mod: CPTII,S$GLB,, | Performed by: NURSE PRACTITIONER

## 2021-09-27 PROCEDURE — 1160F RVW MEDS BY RX/DR IN RCRD: CPT | Mod: CPTII,S$GLB,, | Performed by: NURSE PRACTITIONER

## 2021-09-27 PROCEDURE — 3079F DIAST BP 80-89 MM HG: CPT | Mod: CPTII,S$GLB,, | Performed by: NURSE PRACTITIONER

## 2021-09-27 PROCEDURE — 3288F PR FALLS RISK ASSESSMENT DOCUMENTED: ICD-10-PCS | Mod: CPTII,S$GLB,, | Performed by: NURSE PRACTITIONER

## 2021-09-27 PROCEDURE — 1160F PR REVIEW ALL MEDS BY PRESCRIBER/CLIN PHARMACIST DOCUMENTED: ICD-10-PCS | Mod: CPTII,S$GLB,, | Performed by: NURSE PRACTITIONER

## 2021-09-27 PROCEDURE — 3079F PR MOST RECENT DIASTOLIC BLOOD PRESSURE 80-89 MM HG: ICD-10-PCS | Mod: CPTII,S$GLB,, | Performed by: NURSE PRACTITIONER

## 2021-09-27 PROCEDURE — 3008F BODY MASS INDEX DOCD: CPT | Mod: CPTII,S$GLB,, | Performed by: NURSE PRACTITIONER

## 2021-09-27 PROCEDURE — 99999 PR PBB SHADOW E&M-EST. PATIENT-LVL V: CPT | Mod: PBBFAC,,, | Performed by: NURSE PRACTITIONER

## 2021-09-27 PROCEDURE — 1101F PR PT FALLS ASSESS DOC 0-1 FALLS W/OUT INJ PAST YR: ICD-10-PCS | Mod: CPTII,S$GLB,, | Performed by: NURSE PRACTITIONER

## 2021-09-27 PROCEDURE — 1126F AMNT PAIN NOTED NONE PRSNT: CPT | Mod: CPTII,S$GLB,, | Performed by: NURSE PRACTITIONER

## 2021-09-27 PROCEDURE — 1159F PR MEDICATION LIST DOCUMENTED IN MEDICAL RECORD: ICD-10-PCS | Mod: CPTII,S$GLB,, | Performed by: NURSE PRACTITIONER

## 2021-09-27 PROCEDURE — 1126F PR PAIN SEVERITY QUANTIFIED, NO PAIN PRESENT: ICD-10-PCS | Mod: CPTII,S$GLB,, | Performed by: NURSE PRACTITIONER

## 2021-09-27 PROCEDURE — 99999 PR PBB SHADOW E&M-EST. PATIENT-LVL V: ICD-10-PCS | Mod: PBBFAC,,, | Performed by: NURSE PRACTITIONER

## 2021-09-27 RX ORDER — PREDNISONE 20 MG/1
TABLET ORAL
Qty: 20 TABLET | Refills: 0 | Status: SHIPPED | OUTPATIENT
Start: 2021-09-27 | End: 2021-12-22 | Stop reason: SDUPTHER

## 2021-09-27 RX ORDER — FLUTICASONE FUROATE, UMECLIDINIUM BROMIDE AND VILANTEROL TRIFENATATE 100; 62.5; 25 UG/1; UG/1; UG/1
1 POWDER RESPIRATORY (INHALATION) DAILY
Qty: 60 EACH | Refills: 11 | Status: SHIPPED | OUTPATIENT
Start: 2021-09-27

## 2021-10-25 ENCOUNTER — LAB VISIT (OUTPATIENT)
Dept: LAB | Facility: HOSPITAL | Age: 66
End: 2021-10-25
Attending: NURSE PRACTITIONER
Payer: MEDICARE

## 2021-10-25 DIAGNOSIS — J44.9 CHRONIC OBSTRUCTIVE PULMONARY DISEASE, UNSPECIFIED COPD TYPE: ICD-10-CM

## 2021-10-25 DIAGNOSIS — J47.9 BRONCHIECTASIS WITHOUT COMPLICATION: ICD-10-CM

## 2021-10-25 PROCEDURE — 87077 CULTURE AEROBIC IDENTIFY: CPT | Mod: 59 | Performed by: NURSE PRACTITIONER

## 2021-10-25 PROCEDURE — 87205 SMEAR GRAM STAIN: CPT | Performed by: NURSE PRACTITIONER

## 2021-10-25 PROCEDURE — 87186 SC STD MICRODIL/AGAR DIL: CPT | Mod: 59 | Performed by: NURSE PRACTITIONER

## 2021-10-25 PROCEDURE — 87070 CULTURE OTHR SPECIMN AEROBIC: CPT | Performed by: NURSE PRACTITIONER

## 2021-10-29 LAB
BACTERIA SPEC AEROBE CULT: ABNORMAL
GRAM STN SPEC: ABNORMAL

## 2021-11-01 ENCOUNTER — TELEPHONE (OUTPATIENT)
Dept: PULMONOLOGY | Facility: CLINIC | Age: 66
End: 2021-11-01
Payer: MEDICARE

## 2021-11-01 DIAGNOSIS — J44.0 CHRONIC OBSTRUCTIVE PULMONARY DISEASE WITH ACUTE LOWER RESPIRATORY INFECTION: Primary | ICD-10-CM

## 2021-11-01 RX ORDER — CIPROFLOXACIN 750 MG/1
750 TABLET, FILM COATED ORAL EVERY 12 HOURS
Qty: 14 TABLET | Refills: 0 | Status: SHIPPED | OUTPATIENT
Start: 2021-11-01 | End: 2021-11-08

## 2021-11-08 ENCOUNTER — SPECIALTY PHARMACY (OUTPATIENT)
Dept: PHARMACY | Facility: CLINIC | Age: 66
End: 2021-11-08
Payer: MEDICARE

## 2021-11-23 ENCOUNTER — LAB VISIT (OUTPATIENT)
Dept: LAB | Facility: HOSPITAL | Age: 66
End: 2021-11-23
Attending: NURSE PRACTITIONER
Payer: MEDICARE

## 2021-11-23 DIAGNOSIS — J44.9 CHRONIC OBSTRUCTIVE PULMONARY DISEASE, UNSPECIFIED COPD TYPE: ICD-10-CM

## 2021-11-23 DIAGNOSIS — J47.9 BRONCHIECTASIS WITHOUT COMPLICATION: ICD-10-CM

## 2021-11-23 PROCEDURE — 87077 CULTURE AEROBIC IDENTIFY: CPT | Mod: 59 | Performed by: NURSE PRACTITIONER

## 2021-11-23 PROCEDURE — 87186 SC STD MICRODIL/AGAR DIL: CPT | Mod: 59 | Performed by: NURSE PRACTITIONER

## 2021-11-23 PROCEDURE — 87070 CULTURE OTHR SPECIMN AEROBIC: CPT | Performed by: NURSE PRACTITIONER

## 2021-11-23 PROCEDURE — 87205 SMEAR GRAM STAIN: CPT | Performed by: NURSE PRACTITIONER

## 2021-11-26 ENCOUNTER — TELEPHONE (OUTPATIENT)
Dept: PULMONOLOGY | Facility: CLINIC | Age: 66
End: 2021-11-26
Payer: MEDICARE

## 2021-11-26 LAB
BACTERIA SPEC AEROBE CULT: ABNORMAL
BACTERIA SPEC AEROBE CULT: ABNORMAL
GRAM STN SPEC: ABNORMAL

## 2021-12-09 ENCOUNTER — TELEPHONE (OUTPATIENT)
Dept: PULMONOLOGY | Facility: CLINIC | Age: 66
End: 2021-12-09
Payer: MEDICARE

## 2021-12-22 ENCOUNTER — PATIENT MESSAGE (OUTPATIENT)
Dept: PULMONOLOGY | Facility: CLINIC | Age: 66
End: 2021-12-22
Payer: MEDICARE

## 2021-12-31 ENCOUNTER — SPECIALTY PHARMACY (OUTPATIENT)
Dept: PHARMACY | Facility: CLINIC | Age: 66
End: 2021-12-31
Payer: MEDICARE

## 2022-01-28 ENCOUNTER — TELEPHONE (OUTPATIENT)
Dept: PULMONOLOGY | Facility: CLINIC | Age: 67
End: 2022-01-28
Payer: MEDICARE

## 2022-01-28 NOTE — TELEPHONE ENCOUNTER
----- Message from Candelario Gonzalez sent at 1/28/2022  9:23 AM CST -----  Contact: Daughter/Wendy  Type: Needs Medical Advice  Who Called:  Cadence  Best Call Back Number: [phone]  Additional Information:  Called to speak with office regarding pt's oxygen concentrator prescription

## 2022-02-08 ENCOUNTER — TELEPHONE (OUTPATIENT)
Dept: PULMONOLOGY | Facility: CLINIC | Age: 67
End: 2022-02-08
Payer: MEDICARE

## 2022-02-08 NOTE — TELEPHONE ENCOUNTER
Left voicemail .     ----- Message from Natacha Patrick sent at 2/8/2022 10:42 AM CST -----  Contact: PT  Type: Needs Medical Advice    Who Called:pt  Best Call Back Number:[phone]    Requesting a call back regarding - pt daughter is asking for a call back please she has been missing call backs.  Please Advise- Thank you

## 2022-02-18 DIAGNOSIS — J44.9 CHRONIC OBSTRUCTIVE PULMONARY DISEASE, UNSPECIFIED COPD TYPE: ICD-10-CM

## 2022-02-18 DIAGNOSIS — Z87.09 PERSONAL HISTORY OF ASBESTOSIS: ICD-10-CM

## 2022-02-18 DIAGNOSIS — R06.02 CHRONIC SHORTNESS OF BREATH: ICD-10-CM

## 2022-02-18 RX ORDER — ALBUTEROL SULFATE 90 UG/1
AEROSOL, METERED RESPIRATORY (INHALATION)
Qty: 90 G | Refills: 3 | Status: SHIPPED | OUTPATIENT
Start: 2022-02-18

## 2022-02-21 ENCOUNTER — TELEPHONE (OUTPATIENT)
Dept: PULMONOLOGY | Facility: CLINIC | Age: 67
End: 2022-02-21
Payer: MEDICARE

## 2022-02-21 NOTE — TELEPHONE ENCOUNTER
Spoke with patient's daughter... they are asking if a POC machine can be ordered for patient and sent to [facility] resp...   Patient purchased his own POC last time and his machine has broken and needs new one.       ----- Message from Blanca Dorsey sent at 2/21/2022  9:48 AM CST -----  Contact: pt daughter  Type: Return Call    Who Called: pt daughter  Who Left Message for Pt: julián  Does the patient know what this is regarding: yes  Best Call Back Number: [phone]  Thank you~

## 2022-02-22 ENCOUNTER — TELEPHONE (OUTPATIENT)
Dept: PULMONOLOGY | Facility: CLINIC | Age: 67
End: 2022-02-22
Payer: MEDICARE

## 2022-02-22 DIAGNOSIS — J44.9 CHRONIC OBSTRUCTIVE PULMONARY DISEASE, UNSPECIFIED COPD TYPE: Primary | ICD-10-CM

## 2022-02-22 DIAGNOSIS — J45.50 SEVERE PERSISTENT ASTHMA WITHOUT COMPLICATION: ICD-10-CM

## 2022-02-22 RX ORDER — BENRALIZUMAB 30 MG/ML
30 INJECTION, SOLUTION SUBCUTANEOUS
Qty: 1 ML | Refills: 5 | Status: SHIPPED | OUTPATIENT
Start: 2022-02-22

## 2022-02-22 NOTE — TELEPHONE ENCOUNTER
Spoke with patient's daughter and she states patient is not going to get out to do test as he is terrified of COVID and just wants to purchase POC out. They are asking if order can just be written and sent to [facility] resp. Sent message to provider.

## 2022-02-23 ENCOUNTER — TELEPHONE (OUTPATIENT)
Dept: PULMONOLOGY | Facility: CLINIC | Age: 67
End: 2022-02-23
Payer: MEDICARE

## 2022-02-23 ENCOUNTER — SPECIALTY PHARMACY (OUTPATIENT)
Dept: PHARMACY | Facility: CLINIC | Age: 67
End: 2022-02-23
Payer: MEDICARE

## 2022-02-23 NOTE — TELEPHONE ENCOUNTER
Specialty Pharmacy - Refill Coordination    Specialty Medication Orders Linked to Encounter    Flowsheet Row Most Recent Value   Medication #1 benralizumab (FASENRA PEN) 30 mg/mL AtIn (Order#[id number], Rx#[id number])          Refill Questions - Documented Responses    Flowsheet Row Most Recent Value   Patient Availability and HIPAA Verification    Does patient want to proceed with activity? Yes   HIPAA/medical authority confirmed? Yes   Relationship to patient of person spoken to? Self   Refill Screening Questions    Changes to allergies? No   Changes to medications? No   New conditions since last clinic visit? No   Unplanned office visit, urgent care, ED, or hospital admission in the last 4 weeks? No   How does patient/caregiver feel medication is working? Excellent   Financial problems or insurance changes? No   How many doses of your specialty medications were missed in the last 4 weeks? 0   Would patient like to speak to a pharmacist? No   When does the patient need to receive the medication? 03/04/22   Refill Delivery Questions    How will the patient receive the medication? Delivery Anna   When does the patient need to receive the medication? 03/04/22   Shipping Address Home   Address in [facility] confirmed and updated if neccessary? Yes   Expected Copay ($) 0   Is the patient able to afford the medication copay? Yes   Payment Method zero copay   Days supply of Refill 56   Supplies needed? No supplies needed   Refill activity completed? Yes   Refill activity plan Refill scheduled   Shipment/Pickup Date: 03/03/22          Current Outpatient Medications   Medication Sig    albuterol (PROVENTIL/VENTOLIN HFA) 90 mcg/actuation inhaler INHALE 2 PUFFS BY MOUTH INTO THE LUNGS EVERY 4 HOURS AS NEEDED FOR WHEEZING    albuterol-ipratropium (DUO-NEB) 2.5 mg-0.5 mg/3 mL nebulizer solution Take 3 mLs by nebulization every 6 (six) hours as needed for Wheezing or Shortness of Breath. Rescue    atorvastatin  (LIPITOR) 20 MG tablet TAKE 1 TABLET(20 MG) BY MOUTH EVERY DAY    benralizumab (FASENRA PEN) 30 mg/mL AtIn Inject 30 mg into the skin every 28 days.    benralizumab (FASENRA PEN) 30 mg/mL AtIn Inject 30 mg into the skin every 8 weeks.    COSENTYX PEN, 2 PENS, 150 mg/mL PnIj     ENBREL SURECLICK 50 mg/mL (1 mL) PnIj INJECT 50 MG UNDER THE SKIN TWICE A WEEK FOR 12 WEEKS    mucus clearing device (ACAPELLA, FLUTTER) 1 Device by Misc.(Non-Drug; Combo Route) route 2 (two) times daily.    nicotine (NICODERM CQ) 21 mg/24 hr Place 1 patch onto the skin once daily.    oxyCODONE-acetaminophen (PERCOCET)  mg per tablet     predniSONE (DELTASONE) 20 MG tablet Take one pill a day for three days, repeat for shortness of breath    predniSONE (DELTASONE) 5 MG tablet Take 3 tablets (15 mg total) by mouth once daily.    SKYRIZI 150mg/1.66mL(75 mg/0.83 mL x2) subcutaneous injection     tiZANidine (ZANAFLEX) 4 MG tablet     traMADol (ULTRAM) 50 mg tablet     TRELEGY ELLIPTA 100-62.5-25 mcg DsDv Inhale 1 puff into the lungs once daily.    TREMFYA 100 mg/mL AtIn INJECT 100MG UNDER THE SKIN AT WEEK 0    varenicline (CHANTIX STARTING MONTH BOX) 0.5 mg (11)- 1 mg (42) tablet Take one 0.5mg tab by mouth once daily X3 days,then increase to one 0.5mg tab twice daily X4 days,then increase to one 1mg tab twice daily    varenicline (CHANTIX) 1 mg Tab Take 1 tablet (1 mg total) by mouth 2 (two) times daily.   Last reviewed on 9/27/2021 11:24 AM by Melinda Alamo NP    Review of patient's allergies indicates:  No Known Allergies Last reviewed on  11/1/2021 10:06 AM by Melinda Aalmo      Tasks added this encounter   No tasks added.   Tasks due within next 3 months   2/22/2022 - Refill Call (Auto Added)     Nancy UmanzorD  [facility]  [address] [facility] [address]  Phone: [phone]  Fax: [phone]

## 2022-02-23 NOTE — TELEPHONE ENCOUNTER
Outgoing call regarding Fasenra refill. Called pt to set up refill, [facility] sent over new RX for PT. Called to set up refill, no answer /LVM. Will follow up pt 2/26/22

## 2022-02-23 NOTE — TELEPHONE ENCOUNTER
Called patient daughter and left voicemail... informing her that patient needs to call [facility] and get POC form filled out and faxed over for us to sign...

## 2022-03-29 ENCOUNTER — TELEPHONE (OUTPATIENT)
Dept: PULMONOLOGY | Facility: CLINIC | Age: 67
End: 2022-03-29
Payer: MEDICARE

## 2022-03-29 NOTE — TELEPHONE ENCOUNTER
Spoke with patient daughter. Patient has passed away. She was wanting to know if Melinda could sign death certificate. She gave me [facility]  home [phone]. I spoke with  home. Melinda can not sign due to being NP was asking for MD to sign. MD has declined to sign. Called  home back to verify if any more information was needed.     ----- Message from Kirsty Read sent at 3/29/2022 12:36 PM CDT -----  Contact: Primo Nguyen  Type:  Patient Returning Call    Who Called:  Wendy Tillman  Who Left Message for Patient:  N/A  Does the patient know what this is regarding?: her father, she wouldn't say,   Best Call Back Number:  [phone] (work)  Additional Information:  Says its an emergency and needs to be called before 2:30 please. Thanks